# Patient Record
Sex: MALE | Race: WHITE
[De-identification: names, ages, dates, MRNs, and addresses within clinical notes are randomized per-mention and may not be internally consistent; named-entity substitution may affect disease eponyms.]

---

## 2018-03-07 ENCOUNTER — HOSPITAL ENCOUNTER (EMERGENCY)
Dept: HOSPITAL 8 - ED | Age: 38
Discharge: HOME | End: 2018-03-07
Payer: COMMERCIAL

## 2018-03-07 VITALS — SYSTOLIC BLOOD PRESSURE: 145 MMHG | DIASTOLIC BLOOD PRESSURE: 99 MMHG

## 2018-03-07 VITALS — HEIGHT: 74 IN | BODY MASS INDEX: 30.27 KG/M2 | WEIGHT: 235.89 LBS

## 2018-03-07 DIAGNOSIS — Z79.899: ICD-10-CM

## 2018-03-07 DIAGNOSIS — R11.2: Primary | ICD-10-CM

## 2018-03-07 LAB
ALBUMIN SERPL-MCNC: 4.1 G/DL (ref 3.4–5)
ALP SERPL-CCNC: 61 U/L (ref 45–117)
ALT SERPL-CCNC: 66 U/L (ref 12–78)
ANION GAP SERPL CALC-SCNC: 10 MMOL/L (ref 5–15)
BASOPHILS # BLD AUTO: 0.03 X10^3/UL (ref 0–0.1)
BASOPHILS NFR BLD AUTO: 0 % (ref 0–1)
BILIRUB SERPL-MCNC: 1 MG/DL (ref 0.2–1)
CALCIUM SERPL-MCNC: 9.1 MG/DL (ref 8.5–10.1)
CHLORIDE SERPL-SCNC: 110 MMOL/L (ref 98–107)
CREAT SERPL-MCNC: 1.19 MG/DL (ref 0.7–1.3)
CULTURE INDICATED?: NO
EOSINOPHIL # BLD AUTO: 0.09 X10^3/UL (ref 0–0.4)
EOSINOPHIL NFR BLD AUTO: 1 % (ref 1–7)
ERYTHROCYTE [DISTWIDTH] IN BLOOD BY AUTOMATED COUNT: 13.3 % (ref 9.4–14.8)
LYMPHOCYTES # BLD AUTO: 3.42 X10^3/UL (ref 1–3.4)
LYMPHOCYTES NFR BLD AUTO: 34 % (ref 22–44)
MCH RBC QN AUTO: 29.7 PG (ref 27.5–34.5)
MCHC RBC AUTO-ENTMCNC: 33.9 G/DL (ref 33.2–36.2)
MCV RBC AUTO: 87.6 FL (ref 81–97)
MD: NO
MICROSCOPIC: (no result)
MONOCYTES # BLD AUTO: 0.55 X10^3/UL (ref 0.2–0.8)
MONOCYTES NFR BLD AUTO: 5 % (ref 2–9)
NEUTROPHILS # BLD AUTO: 6.03 X10^3/UL (ref 1.8–6.8)
NEUTROPHILS NFR BLD AUTO: 60 % (ref 42–75)
PLATELET # BLD AUTO: 307 X10^3/UL (ref 130–400)
PMV BLD AUTO: 8.2 FL (ref 7.4–10.4)
PROT SERPL-MCNC: 7.7 G/DL (ref 6.4–8.2)
RBC # BLD AUTO: 5.69 X10^6/UL (ref 4.38–5.82)

## 2018-03-07 PROCEDURE — 74022 RADEX COMPL AQT ABD SERIES: CPT

## 2018-03-07 PROCEDURE — 96374 THER/PROPH/DIAG INJ IV PUSH: CPT

## 2018-03-07 PROCEDURE — 96375 TX/PRO/DX INJ NEW DRUG ADDON: CPT

## 2018-03-07 PROCEDURE — 85025 COMPLETE CBC W/AUTO DIFF WBC: CPT

## 2018-03-07 PROCEDURE — 81003 URINALYSIS AUTO W/O SCOPE: CPT

## 2018-03-07 PROCEDURE — 80307 DRUG TEST PRSMV CHEM ANLYZR: CPT

## 2018-03-07 PROCEDURE — 83690 ASSAY OF LIPASE: CPT

## 2018-03-07 PROCEDURE — 96361 HYDRATE IV INFUSION ADD-ON: CPT

## 2018-03-07 PROCEDURE — 80053 COMPREHEN METABOLIC PANEL: CPT

## 2018-03-07 PROCEDURE — 99285 EMERGENCY DEPT VISIT HI MDM: CPT

## 2018-03-07 PROCEDURE — 36415 COLL VENOUS BLD VENIPUNCTURE: CPT

## 2020-03-14 ENCOUNTER — HOSPITAL ENCOUNTER (INPATIENT)
Facility: MEDICAL CENTER | Age: 40
LOS: 4 days | DRG: 339 | End: 2020-03-19
Attending: EMERGENCY MEDICINE | Admitting: SURGERY
Payer: COMMERCIAL

## 2020-03-14 ENCOUNTER — APPOINTMENT (OUTPATIENT)
Dept: RADIOLOGY | Facility: MEDICAL CENTER | Age: 40
DRG: 339 | End: 2020-03-14
Attending: EMERGENCY MEDICINE
Payer: COMMERCIAL

## 2020-03-14 DIAGNOSIS — K35.30 ACUTE APPENDICITIS WITH LOCALIZED PERITONITIS, WITHOUT PERFORATION, ABSCESS, OR GANGRENE: ICD-10-CM

## 2020-03-14 LAB
ALBUMIN SERPL BCP-MCNC: 4.2 G/DL (ref 3.2–4.9)
ALBUMIN/GLOB SERPL: 1.4 G/DL
ALP SERPL-CCNC: 45 U/L (ref 30–99)
ALT SERPL-CCNC: 21 U/L (ref 2–50)
ANION GAP SERPL CALC-SCNC: 11 MMOL/L (ref 7–16)
AST SERPL-CCNC: 17 U/L (ref 12–45)
BASOPHILS # BLD AUTO: 0.5 % (ref 0–1.8)
BASOPHILS # BLD: 0.09 K/UL (ref 0–0.12)
BILIRUB SERPL-MCNC: 1 MG/DL (ref 0.1–1.5)
BUN SERPL-MCNC: 12 MG/DL (ref 8–22)
CALCIUM SERPL-MCNC: 9.5 MG/DL (ref 8.5–10.5)
CHLORIDE SERPL-SCNC: 105 MMOL/L (ref 96–112)
CO2 SERPL-SCNC: 22 MMOL/L (ref 20–33)
CREAT SERPL-MCNC: 0.99 MG/DL (ref 0.5–1.4)
EOSINOPHIL # BLD AUTO: 0.05 K/UL (ref 0–0.51)
EOSINOPHIL NFR BLD: 0.3 % (ref 0–6.9)
ERYTHROCYTE [DISTWIDTH] IN BLOOD BY AUTOMATED COUNT: 41.1 FL (ref 35.9–50)
GLOBULIN SER CALC-MCNC: 3.1 G/DL (ref 1.9–3.5)
GLUCOSE SERPL-MCNC: 132 MG/DL (ref 65–99)
HCT VFR BLD AUTO: 50.6 % (ref 42–52)
HGB BLD-MCNC: 17.1 G/DL (ref 14–18)
IMM GRANULOCYTES # BLD AUTO: 0.07 K/UL (ref 0–0.11)
IMM GRANULOCYTES NFR BLD AUTO: 0.4 % (ref 0–0.9)
LIPASE SERPL-CCNC: 16 U/L (ref 11–82)
LYMPHOCYTES # BLD AUTO: 1.27 K/UL (ref 1–4.8)
LYMPHOCYTES NFR BLD: 7.1 % (ref 22–41)
MCH RBC QN AUTO: 29.8 PG (ref 27–33)
MCHC RBC AUTO-ENTMCNC: 33.8 G/DL (ref 33.7–35.3)
MCV RBC AUTO: 88.3 FL (ref 81.4–97.8)
MONOCYTES # BLD AUTO: 1.17 K/UL (ref 0–0.85)
MONOCYTES NFR BLD AUTO: 6.5 % (ref 0–13.4)
NEUTROPHILS # BLD AUTO: 15.35 K/UL (ref 1.82–7.42)
NEUTROPHILS NFR BLD: 85.2 % (ref 44–72)
NRBC # BLD AUTO: 0 K/UL
NRBC BLD-RTO: 0 /100 WBC
PLATELET # BLD AUTO: 327 K/UL (ref 164–446)
PMV BLD AUTO: 9.2 FL (ref 9–12.9)
POTASSIUM SERPL-SCNC: 3.9 MMOL/L (ref 3.6–5.5)
PROT SERPL-MCNC: 7.3 G/DL (ref 6–8.2)
RBC # BLD AUTO: 5.73 M/UL (ref 4.7–6.1)
SODIUM SERPL-SCNC: 138 MMOL/L (ref 135–145)
WBC # BLD AUTO: 18 K/UL (ref 4.8–10.8)

## 2020-03-14 PROCEDURE — 83690 ASSAY OF LIPASE: CPT

## 2020-03-14 PROCEDURE — 96374 THER/PROPH/DIAG INJ IV PUSH: CPT

## 2020-03-14 PROCEDURE — 700117 HCHG RX CONTRAST REV CODE 255: Performed by: EMERGENCY MEDICINE

## 2020-03-14 PROCEDURE — 74177 CT ABD & PELVIS W/CONTRAST: CPT

## 2020-03-14 PROCEDURE — 700105 HCHG RX REV CODE 258: Performed by: EMERGENCY MEDICINE

## 2020-03-14 PROCEDURE — 80053 COMPREHEN METABOLIC PANEL: CPT

## 2020-03-14 PROCEDURE — 99291 CRITICAL CARE FIRST HOUR: CPT

## 2020-03-14 PROCEDURE — 85025 COMPLETE CBC W/AUTO DIFF WBC: CPT

## 2020-03-14 PROCEDURE — 700111 HCHG RX REV CODE 636 W/ 250 OVERRIDE (IP): Performed by: EMERGENCY MEDICINE

## 2020-03-14 RX ORDER — ESCITALOPRAM OXALATE 10 MG/1
10 TABLET ORAL DAILY
COMMUNITY

## 2020-03-14 RX ORDER — MORPHINE SULFATE 4 MG/ML
4 INJECTION, SOLUTION INTRAMUSCULAR; INTRAVENOUS
Status: DISPENSED | OUTPATIENT
Start: 2020-03-14 | End: 2020-03-15

## 2020-03-14 RX ORDER — SODIUM CHLORIDE 9 MG/ML
1000 INJECTION, SOLUTION INTRAVENOUS ONCE
Status: COMPLETED | OUTPATIENT
Start: 2020-03-14 | End: 2020-03-15

## 2020-03-14 RX ADMIN — MORPHINE SULFATE 4 MG: 4 INJECTION INTRAVENOUS at 22:39

## 2020-03-14 RX ADMIN — IOHEXOL 100 ML: 350 INJECTION, SOLUTION INTRAVENOUS at 23:07

## 2020-03-14 RX ADMIN — SODIUM CHLORIDE 1000 ML: 9 INJECTION, SOLUTION INTRAVENOUS at 22:38

## 2020-03-14 SDOH — HEALTH STABILITY: MENTAL HEALTH: HOW OFTEN DO YOU HAVE A DRINK CONTAINING ALCOHOL?: 2-3 TIMES A WEEK

## 2020-03-14 ASSESSMENT — PAIN DESCRIPTION - DESCRIPTORS: DESCRIPTORS: SHARP;THROBBING

## 2020-03-14 ASSESSMENT — PAIN SCALES - WONG BAKER: WONGBAKER_NUMERICALRESPONSE: DOESN'T HURT AT ALL

## 2020-03-15 ENCOUNTER — ANESTHESIA (OUTPATIENT)
Dept: SURGERY | Facility: MEDICAL CENTER | Age: 40
DRG: 339 | End: 2020-03-15
Payer: COMMERCIAL

## 2020-03-15 ENCOUNTER — ANESTHESIA EVENT (OUTPATIENT)
Dept: SURGERY | Facility: MEDICAL CENTER | Age: 40
DRG: 339 | End: 2020-03-15
Payer: COMMERCIAL

## 2020-03-15 PROBLEM — K35.30 ACUTE APPENDICITIS WITH LOCALIZED PERITONITIS, WITHOUT PERFORATION OR ABSCESS: Status: ACTIVE | Noted: 2020-03-15

## 2020-03-15 PROCEDURE — 501399 HCHG SPECIMAN BAG, ENDO CATC: Performed by: SURGERY

## 2020-03-15 PROCEDURE — 500868 HCHG NEEDLE, SURGI(VARES): Performed by: SURGERY

## 2020-03-15 PROCEDURE — 501572 HCHG TROCAR, SHIELD OBTU 5X100: Performed by: SURGERY

## 2020-03-15 PROCEDURE — 770006 HCHG ROOM/CARE - MED/SURG/GYN SEMI*

## 2020-03-15 PROCEDURE — 88304 TISSUE EXAM BY PATHOLOGIST: CPT

## 2020-03-15 PROCEDURE — 502571 HCHG PACK, LAP CHOLE: Performed by: SURGERY

## 2020-03-15 PROCEDURE — A6402 STERILE GAUZE <= 16 SQ IN: HCPCS | Performed by: SURGERY

## 2020-03-15 PROCEDURE — 501838 HCHG SUTURE GENERAL: Performed by: SURGERY

## 2020-03-15 PROCEDURE — 700101 HCHG RX REV CODE 250: Performed by: SURGERY

## 2020-03-15 PROCEDURE — 0DTJ4ZZ RESECTION OF APPENDIX, PERCUTANEOUS ENDOSCOPIC APPROACH: ICD-10-PCS | Performed by: SURGERY

## 2020-03-15 PROCEDURE — 160002 HCHG RECOVERY MINUTES (STAT): Performed by: SURGERY

## 2020-03-15 PROCEDURE — 700105 HCHG RX REV CODE 258: Performed by: ANESTHESIOLOGY

## 2020-03-15 PROCEDURE — 700101 HCHG RX REV CODE 250: Performed by: ANESTHESIOLOGY

## 2020-03-15 PROCEDURE — 501338 HCHG SHEARS, ENDO: Performed by: SURGERY

## 2020-03-15 PROCEDURE — 3E02340 INTRODUCTION OF INFLUENZA VACCINE INTO MUSCLE, PERCUTANEOUS APPROACH: ICD-10-PCS | Performed by: SURGERY

## 2020-03-15 PROCEDURE — 160035 HCHG PACU - 1ST 60 MINS PHASE I: Performed by: SURGERY

## 2020-03-15 PROCEDURE — 160039 HCHG SURGERY MINUTES - EA ADDL 1 MIN LEVEL 3: Performed by: SURGERY

## 2020-03-15 PROCEDURE — 90686 IIV4 VACC NO PRSV 0.5 ML IM: CPT | Performed by: SURGERY

## 2020-03-15 PROCEDURE — 700105 HCHG RX REV CODE 258: Performed by: SURGERY

## 2020-03-15 PROCEDURE — 160028 HCHG SURGERY MINUTES - 1ST 30 MINS LEVEL 3: Performed by: SURGERY

## 2020-03-15 PROCEDURE — 160009 HCHG ANES TIME/MIN: Performed by: SURGERY

## 2020-03-15 PROCEDURE — A9270 NON-COVERED ITEM OR SERVICE: HCPCS | Performed by: SURGERY

## 2020-03-15 PROCEDURE — 160048 HCHG OR STATISTICAL LEVEL 1-5: Performed by: SURGERY

## 2020-03-15 PROCEDURE — 700111 HCHG RX REV CODE 636 W/ 250 OVERRIDE (IP): Performed by: SURGERY

## 2020-03-15 PROCEDURE — A9270 NON-COVERED ITEM OR SERVICE: HCPCS | Performed by: ANESTHESIOLOGY

## 2020-03-15 PROCEDURE — 501582 HCHG TROCAR, THRD BLADED: Performed by: SURGERY

## 2020-03-15 PROCEDURE — 700102 HCHG RX REV CODE 250 W/ 637 OVERRIDE(OP): Performed by: ANESTHESIOLOGY

## 2020-03-15 PROCEDURE — 700111 HCHG RX REV CODE 636 W/ 250 OVERRIDE (IP): Performed by: ANESTHESIOLOGY

## 2020-03-15 PROCEDURE — 700102 HCHG RX REV CODE 250 W/ 637 OVERRIDE(OP): Performed by: SURGERY

## 2020-03-15 RX ORDER — IPRATROPIUM BROMIDE AND ALBUTEROL SULFATE 2.5; .5 MG/3ML; MG/3ML
3 SOLUTION RESPIRATORY (INHALATION)
Status: DISCONTINUED | OUTPATIENT
Start: 2020-03-15 | End: 2020-03-15 | Stop reason: HOSPADM

## 2020-03-15 RX ORDER — OXYCODONE AND ACETAMINOPHEN 10; 325 MG/1; MG/1
1 TABLET ORAL EVERY 6 HOURS PRN
Status: DISCONTINUED | OUTPATIENT
Start: 2020-03-15 | End: 2020-03-19 | Stop reason: HOSPADM

## 2020-03-15 RX ORDER — ONDANSETRON 2 MG/ML
4 INJECTION INTRAMUSCULAR; INTRAVENOUS
Status: DISCONTINUED | OUTPATIENT
Start: 2020-03-15 | End: 2020-03-15 | Stop reason: HOSPADM

## 2020-03-15 RX ORDER — HYDROMORPHONE HYDROCHLORIDE 1 MG/ML
1 INJECTION, SOLUTION INTRAMUSCULAR; INTRAVENOUS; SUBCUTANEOUS
Status: DISCONTINUED | OUTPATIENT
Start: 2020-03-15 | End: 2020-03-15 | Stop reason: HOSPADM

## 2020-03-15 RX ORDER — KETOROLAC TROMETHAMINE 30 MG/ML
INJECTION, SOLUTION INTRAMUSCULAR; INTRAVENOUS PRN
Status: DISCONTINUED | OUTPATIENT
Start: 2020-03-15 | End: 2020-03-15 | Stop reason: SURG

## 2020-03-15 RX ORDER — MIDAZOLAM HYDROCHLORIDE 1 MG/ML
INJECTION INTRAMUSCULAR; INTRAVENOUS PRN
Status: DISCONTINUED | OUTPATIENT
Start: 2020-03-15 | End: 2020-03-15 | Stop reason: SURG

## 2020-03-15 RX ORDER — DEXAMETHASONE SODIUM PHOSPHATE 4 MG/ML
INJECTION, SOLUTION INTRA-ARTICULAR; INTRALESIONAL; INTRAMUSCULAR; INTRAVENOUS; SOFT TISSUE PRN
Status: DISCONTINUED | OUTPATIENT
Start: 2020-03-15 | End: 2020-03-15 | Stop reason: SURG

## 2020-03-15 RX ORDER — OXYCODONE HCL 5 MG/5 ML
10 SOLUTION, ORAL ORAL
Status: COMPLETED | OUTPATIENT
Start: 2020-03-15 | End: 2020-03-15

## 2020-03-15 RX ORDER — SODIUM CHLORIDE 9 MG/ML
INJECTION, SOLUTION INTRAVENOUS
Status: DISCONTINUED | OUTPATIENT
Start: 2020-03-15 | End: 2020-03-15 | Stop reason: SURG

## 2020-03-15 RX ORDER — LIDOCAINE HYDROCHLORIDE 40 MG/ML
SOLUTION TOPICAL PRN
Status: DISCONTINUED | OUTPATIENT
Start: 2020-03-15 | End: 2020-03-15 | Stop reason: SURG

## 2020-03-15 RX ORDER — SCOLOPAMINE TRANSDERMAL SYSTEM 1 MG/1
1 PATCH, EXTENDED RELEASE TRANSDERMAL
Status: DISCONTINUED | OUTPATIENT
Start: 2020-03-15 | End: 2020-03-19 | Stop reason: HOSPADM

## 2020-03-15 RX ORDER — CEFOTETAN DISODIUM 2 G/20ML
INJECTION, POWDER, FOR SOLUTION INTRAMUSCULAR; INTRAVENOUS PRN
Status: DISCONTINUED | OUTPATIENT
Start: 2020-03-15 | End: 2020-03-15 | Stop reason: SURG

## 2020-03-15 RX ORDER — MORPHINE SULFATE 4 MG/ML
1-5 INJECTION, SOLUTION INTRAMUSCULAR; INTRAVENOUS
Status: DISCONTINUED | OUTPATIENT
Start: 2020-03-15 | End: 2020-03-19 | Stop reason: HOSPADM

## 2020-03-15 RX ORDER — ONDANSETRON 2 MG/ML
INJECTION INTRAMUSCULAR; INTRAVENOUS PRN
Status: DISCONTINUED | OUTPATIENT
Start: 2020-03-15 | End: 2020-03-15 | Stop reason: SURG

## 2020-03-15 RX ORDER — METOPROLOL TARTRATE 1 MG/ML
INJECTION, SOLUTION INTRAVENOUS PRN
Status: DISCONTINUED | OUTPATIENT
Start: 2020-03-15 | End: 2020-03-15 | Stop reason: SURG

## 2020-03-15 RX ORDER — DIPHENHYDRAMINE HYDROCHLORIDE 50 MG/ML
12.5 INJECTION INTRAMUSCULAR; INTRAVENOUS
Status: DISCONTINUED | OUTPATIENT
Start: 2020-03-15 | End: 2020-03-15 | Stop reason: HOSPADM

## 2020-03-15 RX ORDER — MIDAZOLAM HYDROCHLORIDE 1 MG/ML
1 INJECTION INTRAMUSCULAR; INTRAVENOUS
Status: DISCONTINUED | OUTPATIENT
Start: 2020-03-15 | End: 2020-03-15 | Stop reason: HOSPADM

## 2020-03-15 RX ORDER — DIPHENHYDRAMINE HYDROCHLORIDE 50 MG/ML
25 INJECTION INTRAMUSCULAR; INTRAVENOUS EVERY 6 HOURS PRN
Status: DISCONTINUED | OUTPATIENT
Start: 2020-03-15 | End: 2020-03-19 | Stop reason: HOSPADM

## 2020-03-15 RX ORDER — OXYCODONE HCL 5 MG/5 ML
5 SOLUTION, ORAL ORAL
Status: COMPLETED | OUTPATIENT
Start: 2020-03-15 | End: 2020-03-15

## 2020-03-15 RX ORDER — DEXTROSE MONOHYDRATE, SODIUM CHLORIDE, AND POTASSIUM CHLORIDE 50; 1.49; 4.5 G/1000ML; G/1000ML; G/1000ML
INJECTION, SOLUTION INTRAVENOUS CONTINUOUS
Status: DISCONTINUED | OUTPATIENT
Start: 2020-03-15 | End: 2020-03-15

## 2020-03-15 RX ORDER — MEPERIDINE HYDROCHLORIDE 25 MG/ML
12.5 INJECTION INTRAMUSCULAR; INTRAVENOUS; SUBCUTANEOUS
Status: DISCONTINUED | OUTPATIENT
Start: 2020-03-15 | End: 2020-03-15 | Stop reason: HOSPADM

## 2020-03-15 RX ORDER — SODIUM CHLORIDE, SODIUM GLUCONATE, SODIUM ACETATE, POTASSIUM CHLORIDE AND MAGNESIUM CHLORIDE 526; 502; 368; 37; 30 MG/100ML; MG/100ML; MG/100ML; MG/100ML; MG/100ML
500 INJECTION, SOLUTION INTRAVENOUS CONTINUOUS
Status: DISCONTINUED | OUTPATIENT
Start: 2020-03-15 | End: 2020-03-15 | Stop reason: HOSPADM

## 2020-03-15 RX ORDER — SODIUM CHLORIDE, SODIUM GLUCONATE, SODIUM ACETATE, POTASSIUM CHLORIDE AND MAGNESIUM CHLORIDE 526; 502; 368; 37; 30 MG/100ML; MG/100ML; MG/100ML; MG/100ML; MG/100ML
INJECTION, SOLUTION INTRAVENOUS
Status: DISCONTINUED | OUTPATIENT
Start: 2020-03-15 | End: 2020-03-15 | Stop reason: SURG

## 2020-03-15 RX ORDER — HALOPERIDOL 5 MG/ML
1 INJECTION INTRAMUSCULAR
Status: DISCONTINUED | OUTPATIENT
Start: 2020-03-15 | End: 2020-03-15 | Stop reason: HOSPADM

## 2020-03-15 RX ORDER — BUPIVACAINE HYDROCHLORIDE AND EPINEPHRINE 5; 5 MG/ML; UG/ML
INJECTION, SOLUTION EPIDURAL; INTRACAUDAL; PERINEURAL
Status: DISCONTINUED | OUTPATIENT
Start: 2020-03-15 | End: 2020-03-15 | Stop reason: HOSPADM

## 2020-03-15 RX ORDER — HYDROMORPHONE HYDROCHLORIDE 1 MG/ML
0.4 INJECTION, SOLUTION INTRAMUSCULAR; INTRAVENOUS; SUBCUTANEOUS
Status: DISCONTINUED | OUTPATIENT
Start: 2020-03-15 | End: 2020-03-15 | Stop reason: HOSPADM

## 2020-03-15 RX ORDER — ONDANSETRON 2 MG/ML
4 INJECTION INTRAMUSCULAR; INTRAVENOUS EVERY 4 HOURS PRN
Status: DISCONTINUED | OUTPATIENT
Start: 2020-03-15 | End: 2020-03-19 | Stop reason: HOSPADM

## 2020-03-15 RX ORDER — HYDROMORPHONE HYDROCHLORIDE 1 MG/ML
0.2 INJECTION, SOLUTION INTRAMUSCULAR; INTRAVENOUS; SUBCUTANEOUS
Status: DISCONTINUED | OUTPATIENT
Start: 2020-03-15 | End: 2020-03-15 | Stop reason: HOSPADM

## 2020-03-15 RX ADMIN — POTASSIUM CHLORIDE, DEXTROSE MONOHYDRATE AND SODIUM CHLORIDE: 150; 5; 450 INJECTION, SOLUTION INTRAVENOUS at 12:57

## 2020-03-15 RX ADMIN — ONDANSETRON 4 MG: 2 INJECTION INTRAMUSCULAR; INTRAVENOUS at 02:14

## 2020-03-15 RX ADMIN — MORPHINE SULFATE 4 MG: 4 INJECTION INTRAVENOUS at 06:29

## 2020-03-15 RX ADMIN — METRONIDAZOLE 500 MG: 500 INJECTION, SOLUTION INTRAVENOUS at 01:37

## 2020-03-15 RX ADMIN — FENTANYL CITRATE 100 MCG: 50 INJECTION, SOLUTION INTRAMUSCULAR; INTRAVENOUS at 02:16

## 2020-03-15 RX ADMIN — OXYCODONE HYDROCHLORIDE 5 MG: 5 SOLUTION ORAL at 03:02

## 2020-03-15 RX ADMIN — OXYCODONE AND ACETAMINOPHEN 1 TABLET: 10; 325 TABLET ORAL at 20:07

## 2020-03-15 RX ADMIN — OXYCODONE AND ACETAMINOPHEN 1 TABLET: 10; 325 TABLET ORAL at 09:42

## 2020-03-15 RX ADMIN — MIDAZOLAM HYDROCHLORIDE 2 MG: 1 INJECTION, SOLUTION INTRAMUSCULAR; INTRAVENOUS at 01:24

## 2020-03-15 RX ADMIN — FENTANYL CITRATE 100 MCG: 50 INJECTION, SOLUTION INTRAMUSCULAR; INTRAVENOUS at 01:24

## 2020-03-15 RX ADMIN — INFLUENZA A VIRUS A/BRISBANE/02/2018 IVR-190 (H1N1) ANTIGEN (FORMALDEHYDE INACTIVATED), INFLUENZA A VIRUS A/KANSAS/14/2017 X-327 (H3N2) ANTIGEN (FORMALDEHYDE INACTIVATED), INFLUENZA B VIRUS B/PHUKET/3073/2013 ANTIGEN (FORMALDEHYDE INACTIVATED), AND INFLUENZA B VIRUS B/MARYLAND/15/2016 BX-69A ANTIGEN (FORMALDEHYDE INACTIVATED) 0.5 ML: 15; 15; 15; 15 INJECTION, SUSPENSION INTRAMUSCULAR at 14:53

## 2020-03-15 RX ADMIN — MORPHINE SULFATE 4 MG: 4 INJECTION INTRAVENOUS at 14:54

## 2020-03-15 RX ADMIN — CEFTRIAXONE SODIUM 2 G: 2 INJECTION, POWDER, FOR SOLUTION INTRAMUSCULAR; INTRAVENOUS at 12:57

## 2020-03-15 RX ADMIN — POTASSIUM CHLORIDE, DEXTROSE MONOHYDRATE AND SODIUM CHLORIDE: 150; 5; 450 INJECTION, SOLUTION INTRAVENOUS at 05:44

## 2020-03-15 RX ADMIN — KETOROLAC TROMETHAMINE 30 MG: 30 INJECTION, SOLUTION INTRAMUSCULAR at 02:14

## 2020-03-15 RX ADMIN — SUGAMMADEX 200 MG: 100 INJECTION, SOLUTION INTRAVENOUS at 02:14

## 2020-03-15 RX ADMIN — METRONIDAZOLE 500 MG: 500 INJECTION, SOLUTION INTRAVENOUS at 08:40

## 2020-03-15 RX ADMIN — METOPROLOL TARTRATE 5 MG: 5 INJECTION, SOLUTION INTRAVENOUS at 01:58

## 2020-03-15 RX ADMIN — LIDOCAINE HYDROCHLORIDE 4 ML: 40 SOLUTION TOPICAL at 01:31

## 2020-03-15 RX ADMIN — SODIUM CHLORIDE, SODIUM GLUCONATE, SODIUM ACETATE, POTASSIUM CHLORIDE AND MAGNESIUM CHLORIDE 500 ML: 526; 502; 368; 37; 30 INJECTION, SOLUTION INTRAVENOUS at 03:23

## 2020-03-15 RX ADMIN — ROCURONIUM BROMIDE 80 MG: 10 INJECTION, SOLUTION INTRAVENOUS at 01:31

## 2020-03-15 RX ADMIN — PROPOFOL 150 MG: 10 INJECTION, EMULSION INTRAVENOUS at 01:31

## 2020-03-15 RX ADMIN — SODIUM CHLORIDE, SODIUM GLUCONATE, SODIUM ACETATE, POTASSIUM CHLORIDE AND MAGNESIUM CHLORIDE: 526; 502; 368; 37; 30 INJECTION, SOLUTION INTRAVENOUS at 01:49

## 2020-03-15 RX ADMIN — SODIUM CHLORIDE: 9 INJECTION, SOLUTION INTRAVENOUS at 01:22

## 2020-03-15 RX ADMIN — DEXAMETHASONE SODIUM PHOSPHATE 8 MG: 4 INJECTION, SOLUTION INTRA-ARTICULAR; INTRALESIONAL; INTRAMUSCULAR; INTRAVENOUS; SOFT TISSUE at 01:37

## 2020-03-15 RX ADMIN — LIDOCAINE HYDROCHLORIDE 50 MG: 20 INJECTION, SOLUTION INTRAVENOUS at 01:31

## 2020-03-15 RX ADMIN — CEFOTETAN DISODIUM 2 G: 2 INJECTION, POWDER, FOR SOLUTION INTRAMUSCULAR; INTRAVENOUS at 01:37

## 2020-03-15 RX ADMIN — METRONIDAZOLE 500 MG: 500 INJECTION, SOLUTION INTRAVENOUS at 15:19

## 2020-03-15 ASSESSMENT — PATIENT HEALTH QUESTIONNAIRE - PHQ9
2. FEELING DOWN, DEPRESSED, IRRITABLE, OR HOPELESS: NOT AT ALL
2. FEELING DOWN, DEPRESSED, IRRITABLE, OR HOPELESS: NOT AT ALL
1. LITTLE INTEREST OR PLEASURE IN DOING THINGS: NOT AT ALL
SUM OF ALL RESPONSES TO PHQ9 QUESTIONS 1 AND 2: 0
1. LITTLE INTEREST OR PLEASURE IN DOING THINGS: NOT AT ALL
SUM OF ALL RESPONSES TO PHQ9 QUESTIONS 1 AND 2: 0

## 2020-03-15 ASSESSMENT — COGNITIVE AND FUNCTIONAL STATUS - GENERAL
DAILY ACTIVITIY SCORE: 22
MOVING FROM LYING ON BACK TO SITTING ON SIDE OF FLAT BED: A LITTLE
MOVING TO AND FROM BED TO CHAIR: A LITTLE
CLIMB 3 TO 5 STEPS WITH RAILING: A LITTLE
SUGGESTED CMS G CODE MODIFIER DAILY ACTIVITY: CJ
TURNING FROM BACK TO SIDE WHILE IN FLAT BAD: A LITTLE
HELP NEEDED FOR BATHING: A LITTLE
SUGGESTED CMS G CODE MODIFIER MOBILITY: CK
STANDING UP FROM CHAIR USING ARMS: A LITTLE
WALKING IN HOSPITAL ROOM: A LITTLE
DRESSING REGULAR LOWER BODY CLOTHING: A LITTLE
MOBILITY SCORE: 18

## 2020-03-15 ASSESSMENT — FIBROSIS 4 INDEX: FIB4 SCORE: 0.47

## 2020-03-15 ASSESSMENT — ENCOUNTER SYMPTOMS
NAUSEA: 0
CARDIOVASCULAR NEGATIVE: 1
RESPIRATORY NEGATIVE: 1
VOMITING: 0

## 2020-03-15 ASSESSMENT — PAIN SCALES - GENERAL: PAIN_LEVEL: 0

## 2020-03-15 NOTE — PROGRESS NOTES
Surgical Progress Note    Author: Leonardo Orellana M.D. Date & Time created: 3/15/2020   9:34 AM     Interval Events:    Review of Systems   Respiratory: Negative.    Cardiovascular: Negative.    Gastrointestinal: Negative for nausea and vomiting.        Pain much improved     Hemodynamics:  Temp (24hrs), Av.9 °C (98.4 °F), Min:36.1 °C (97 °F), Max:37.4 °C (99.4 °F)  Temperature: 37.1 °C (98.8 °F)  Pulse  Av.5  Min: 63  Max: 101   Blood Pressure: 131/89     Respiratory:    Respiration: 16, Pulse Oximetry: 93 %           Neuro:  GCS       Fluids:    Intake/Output Summary (Last 24 hours) at 3/15/2020 0934  Last data filed at 3/15/2020 0430  Gross per 24 hour   Intake 1200 ml   Output 800 ml   Net 400 ml     Weight: 98.1 kg (216 lb 4.3 oz)  Current Diet Order   Procedures   • Diet Order Clear Liquid     Physical Exam  Cardiovascular:      Rate and Rhythm: Normal rate.   Pulmonary:      Effort: Pulmonary effort is normal.   Abdominal:      General: There is no distension.      Palpations: Abdomen is soft.      Comments: Incisional tenderness, serosanguineous JEREMIAS output        Labs:  Recent Results (from the past 24 hour(s))   CBC WITH DIFFERENTIAL    Collection Time: 20  9:58 PM   Result Value Ref Range    WBC 18.0 (H) 4.8 - 10.8 K/uL    RBC 5.73 4.70 - 6.10 M/uL    Hemoglobin 17.1 14.0 - 18.0 g/dL    Hematocrit 50.6 42.0 - 52.0 %    MCV 88.3 81.4 - 97.8 fL    MCH 29.8 27.0 - 33.0 pg    MCHC 33.8 33.7 - 35.3 g/dL    RDW 41.1 35.9 - 50.0 fL    Platelet Count 327 164 - 446 K/uL    MPV 9.2 9.0 - 12.9 fL    Neutrophils-Polys 85.20 (H) 44.00 - 72.00 %    Lymphocytes 7.10 (L) 22.00 - 41.00 %    Monocytes 6.50 0.00 - 13.40 %    Eosinophils 0.30 0.00 - 6.90 %    Basophils 0.50 0.00 - 1.80 %    Immature Granulocytes 0.40 0.00 - 0.90 %    Nucleated RBC 0.00 /100 WBC    Neutrophils (Absolute) 15.35 (H) 1.82 - 7.42 K/uL    Lymphs (Absolute) 1.27 1.00 - 4.80 K/uL    Monos (Absolute) 1.17 (H) 0.00 - 0.85 K/uL    Eos  (Absolute) 0.05 0.00 - 0.51 K/uL    Baso (Absolute) 0.09 0.00 - 0.12 K/uL    Immature Granulocytes (abs) 0.07 0.00 - 0.11 K/uL    NRBC (Absolute) 0.00 K/uL   COMP METABOLIC PANEL    Collection Time: 03/14/20  9:58 PM   Result Value Ref Range    Sodium 138 135 - 145 mmol/L    Potassium 3.9 3.6 - 5.5 mmol/L    Chloride 105 96 - 112 mmol/L    Co2 22 20 - 33 mmol/L    Anion Gap 11.0 7.0 - 16.0    Glucose 132 (H) 65 - 99 mg/dL    Bun 12 8 - 22 mg/dL    Creatinine 0.99 0.50 - 1.40 mg/dL    Calcium 9.5 8.5 - 10.5 mg/dL    AST(SGOT) 17 12 - 45 U/L    ALT(SGPT) 21 2 - 50 U/L    Alkaline Phosphatase 45 30 - 99 U/L    Total Bilirubin 1.0 0.1 - 1.5 mg/dL    Albumin 4.2 3.2 - 4.9 g/dL    Total Protein 7.3 6.0 - 8.2 g/dL    Globulin 3.1 1.9 - 3.5 g/dL    A-G Ratio 1.4 g/dL   LIPASE    Collection Time: 03/14/20  9:58 PM   Result Value Ref Range    Lipase 16 11 - 82 U/L   ESTIMATED GFR    Collection Time: 03/14/20  9:58 PM   Result Value Ref Range    GFR If African American >60 >60 mL/min/1.73 m 2    GFR If Non African American >60 >60 mL/min/1.73 m 2     Medical Decision Making, by Problem:  Active Hospital Problems    Diagnosis   • Acute appendicitis with localized peritonitis, without perforation or abscess [K35.30]     Plan:  Start clears, continue antibiotics, ambulate. Doing well CBC in AM    Quality Measures:  Quality-Core Measures    Discussed patient condition with RN and Patient

## 2020-03-15 NOTE — H&P
HISTORY OF PRESENT ILLNESS:  The patient is a 39-year-old gentleman who began   feeling sick yesterday with abdominal pain that localized to the right lower   quadrant, became more severe, causing him to seek medical attention this   evening where he was evaluated in the ER, found to have an 18,000 white count   and on CT scan evidence of acute appendicitis without perforation.  I was   asked to see the patient.  Patient states that he has had nausea and vomiting,   diarrhea since yesterday, but none this evening.  He describes the pain as a   10 and localized to the right lower quadrant.    ALLERGIES:  No known drug allergies.    MEDICATIONS:  Lexapro 10 mg p.o. daily.    PAST MEDICAL HISTORY:  Depression.    PAST SURGICAL HISTORY:  No previous surgical history.    SOCIAL HISTORY:  Does not smoke.  Rare alcohol use, no drug use.    REVIEW OF SYSTEMS:  Patient denies any neurological or cardiopulmonary   problems.  24-hour history of nausea, vomiting, abdominal pain, and diarrhea.    No urinary tract symptoms.  No musculoskeletal complaints.  All other systems   are negative and noncontributory.    FAMILY HISTORY:  No significant family history.    PHYSICAL EXAMINATION:  VITAL SIGNS:  Temperature of 37.4, heart rate of 88, respirations 16, blood   pressure 137/79.  HEAD AND NECK:  Pupils equal, round, reactive to light.  Extraocular movements   are intact.  No scleral icterus.  NECK:  No cervical adenopathy.  LUNGS:  Clear bilaterally without wheezes, rales or rhonchi.  Normal chest   wall expansion.  HEART:  Regular rate and rhythm without murmurs, rubs or gallops.  No carotid   bruits.  No jugular venous distention.  No peripheral edema.  ABDOMEN:  Soft, but with hypoactive bowel sounds with pinpoint tenderness on   McBurney's point with rebound, guarding, and peritoneal signs.  MUSCULOSKELETAL:  Moves all 4 extremities in normal range of motion.  Strength   grossly normal.  NEUROLOGICAL:  Cranial nerves II-XII  are grossly intact.  Sensation grossly   normal.  PSYCHIATRIC:  Alert and oriented x3.  Mood and affect are appropriate.  LYMPHATICS:  No cervical, supraclavicular, or inguinal adenopathy.  SKIN:  Normal texture, turgor and coloration.    IMPRESSION:  1.  Acute appendicitis.  2.  History of depression, currently being treated.    PLAN:  Laparoscopic appendectomy.  I have explained the risks and benefits of   surgery to him including bleeding, infection, injury to the bowel, appendiceal   stump leak, and conversion to open technique.  He understands these risks and   agrees to proceed.       ____________________________________     Leonardo Orellana MD    PMS / NTS    DD:  03/15/2020 01:14:46  DT:  03/15/2020 05:16:08    D#:  [id number]  Job#:  [id number]

## 2020-03-15 NOTE — ED PROVIDER NOTES
"ED Provider Note    Scribed for Sharan Ghotra M.D. by Janet Coto. 3/14/2020, 10:00 PM.    Primary care provider: Dr. Crump  Means of arrival: EMS  History obtained from: Patient  History limited by: None    CHIEF COMPLAINT  Chief Complaint   Patient presents with   • Abdominal Pain   • Nausea/Vomiting/Diarrhea     HPI  Alek Davila is a 39 y.o. male who presents to the Emergency Department for evaluation of abdominal pain with associated nausea, vomiting, and diarrhea onset yesterday. The patient reports the pain has been constant and has not moved from the right lower quadrant. He denies any associated testicle pain. The patient reports a history of undiagnosed GI issues. He states that he may have had pancreatitis. He does not have a history of abdominal surgeries. The patient reports that he last ate at 10:30 AM yesterday but has been drinking fluids. The patient denies any known sick contact but reports that he does have regular contact with the public due to the nature of his job.     REVIEW OF SYSTEMS  See HPI for further details. All other systems are negative.     PAST MEDICAL HISTORY   has a past medical history of Anxiety, Depression, and Pancreatitis.    SURGICAL HISTORY  patient denies any surgical history    SOCIAL HISTORY  Social History     Tobacco Use   • Smoking status: Never Smoker   • Smokeless tobacco: Never Used   Substance Use Topics   • Alcohol use: Not Currently     Frequency: 2-3 times a week     Comment: social   • Drug use: Never      Social History     Substance and Sexual Activity   Drug Use Never     FAMILY HISTORY  History reviewed. No pertinent family history.    CURRENT MEDICATIONS  Reviewed.  See Encounter Summary.     ALLERGIES  No Known Allergies    PHYSICAL EXAM  VITAL SIGNS: /78   Pulse 97   Temp 36.1 °C (97 °F) (Temporal)   Resp (!) 22   Ht 1.88 m (6' 2\")   Wt 102.1 kg (225 lb)   SpO2 95%   BMI 28.89 kg/m²   Constitutional: Alert. Mild distress. "   HENT: No signs of trauma, Bilateral external ears normal, Nose normal.   Eyes: Pupils are equal and reactive, Conjunctiva normal, Non-icteric.   Neck: Normal range of motion, No tenderness, Supple, No stridor.   Cardiovascular: Regular rate and rhythm, no murmurs.   Thorax & Lungs: Normal breath sounds, No respiratory distress, No wheezing, No chest tenderness.   Abdomen: Right lower quadrant tenderness, guarding, unable to perform rebound secondary to the patient's discomfort, Soft, No masses, No pulsatile masses. No peritoneal signs.  Skin: Warm, Dry, No erythema, No rash.   Back: No bony tenderness, No CVA tenderness.   Extremities: Intact distal pulses, No edema, No tenderness, No cyanosis  Musculoskeletal: Good range of motion in all major joints. No tenderness to palpation or major deformities noted.   Neurologic: Alert , Normal motor function, Normal sensory function, No focal deficits noted.   Psychiatric: Affect normal, Judgment normal, Mood normal.     DIAGNOSTIC STUDIES / PROCEDURES     LABS  Results for orders placed or performed during the hospital encounter of 03/14/20   CBC WITH DIFFERENTIAL   Result Value Ref Range    WBC 18.0 (H) 4.8 - 10.8 K/uL    RBC 5.73 4.70 - 6.10 M/uL    Hemoglobin 17.1 14.0 - 18.0 g/dL    Hematocrit 50.6 42.0 - 52.0 %    MCV 88.3 81.4 - 97.8 fL    MCH 29.8 27.0 - 33.0 pg    MCHC 33.8 33.7 - 35.3 g/dL    RDW 41.1 35.9 - 50.0 fL    Platelet Count 327 164 - 446 K/uL    MPV 9.2 9.0 - 12.9 fL    Neutrophils-Polys 85.20 (H) 44.00 - 72.00 %    Lymphocytes 7.10 (L) 22.00 - 41.00 %    Monocytes 6.50 0.00 - 13.40 %    Eosinophils 0.30 0.00 - 6.90 %    Basophils 0.50 0.00 - 1.80 %    Immature Granulocytes 0.40 0.00 - 0.90 %    Nucleated RBC 0.00 /100 WBC    Neutrophils (Absolute) 15.35 (H) 1.82 - 7.42 K/uL    Lymphs (Absolute) 1.27 1.00 - 4.80 K/uL    Monos (Absolute) 1.17 (H) 0.00 - 0.85 K/uL    Eos (Absolute) 0.05 0.00 - 0.51 K/uL    Baso (Absolute) 0.09 0.00 - 0.12 K/uL    Immature  Granulocytes (abs) 0.07 0.00 - 0.11 K/uL    NRBC (Absolute) 0.00 K/uL   COMP METABOLIC PANEL   Result Value Ref Range    Sodium 138 135 - 145 mmol/L    Potassium 3.9 3.6 - 5.5 mmol/L    Chloride 105 96 - 112 mmol/L    Co2 22 20 - 33 mmol/L    Anion Gap 11.0 7.0 - 16.0    Glucose 132 (H) 65 - 99 mg/dL    Bun 12 8 - 22 mg/dL    Creatinine 0.99 0.50 - 1.40 mg/dL    Calcium 9.5 8.5 - 10.5 mg/dL    AST(SGOT) 17 12 - 45 U/L    ALT(SGPT) 21 2 - 50 U/L    Alkaline Phosphatase 45 30 - 99 U/L    Total Bilirubin 1.0 0.1 - 1.5 mg/dL    Albumin 4.2 3.2 - 4.9 g/dL    Total Protein 7.3 6.0 - 8.2 g/dL    Globulin 3.1 1.9 - 3.5 g/dL    A-G Ratio 1.4 g/dL   LIPASE   Result Value Ref Range    Lipase 16 11 - 82 U/L   ESTIMATED GFR   Result Value Ref Range    GFR If African American >60 >60 mL/min/1.73 m 2    GFR If Non African American >60 >60 mL/min/1.73 m 2     All labs were reviewed by me.    RADIOLOGY  CT-ABDOMEN-PELVIS WITH   Final Result         Findings in keeping with acute appendicitis. No free air or intra-abdominal abscess. Small amount of free fluid.                 The radiologist's interpretation of all radiological studies and images have been reviewed by me.    COURSE & MEDICAL DECISION MAKING  Pertinent Labs & Imaging studies reviewed. (See chart for details)    10:00 PM - Patient seen and examined at bedside. Patient will be treated with NS infusion 1,000 mL and morphine 4 mg/mL. Ordered CT-Abdomen-Pelvis, estimated GFR, CBC with differential, CMP, lipase, and UA to evaluate his symptoms.     11:44 PM - Paged Surgery.    11:46 PM - I spoke with Dr. Orellana (Surgery) who agrees to evaluate the patient for hospitalization.    11:53 PM - I informed the patient that he has acute appendicitis and that Dr. Orellana will take him to surgery after he finishes his other case. The patient understands the plan of care and verbalizes his agreement.     HYDRATION: Based on the patient's presentation of Tachycardia and Other NPO and  sepsis the patient was given IV fluids. IV Hydration was used because oral hydration was not adequate alone. Upon recheck following hydration, the patient was improved.    Decision Making:  This is a 39 y.o. year old male who presents with above complaint.  Worsening focal abdominal pain as described above.  Significant leukocytosis and CT imaging confirming acute appendicitis without perforation or abscess.  I discussed the case with surgeon on-call which will take the patient to the OR for definitive care.  Patient has been kept n.p.o.    DISPOSITION:  Patient will be hospitalized by Dr. Orellana in guarded condition.    FINAL IMPRESSION  1. Acute appendicitis with localized peritonitis, without perforation, abscess, or gangrene          Janet POSADAS (Scribhoward), am scribing for, and in the presence of, Sharan Ghotra M.D..    Electronically signed by: Janet Coto (Edda), 3/14/2020    Sharan POSADAS M.D. personally performed the services described in this documentation, as scribed by Janet Coto in my presence, and it is both accurate and complete.    C.     The note accurately reflects work and decisions made by me.  Sharan Ghotra M.D.  3/15/2020  5:16 AM

## 2020-03-15 NOTE — ANESTHESIA PREPROCEDURE EVALUATION
Relevant Problems   No relevant active problems       Physical Exam    Airway   Mallampati: II  TM distance: >3 FB  Neck ROM: full       Cardiovascular - normal exam  Rhythm: regular  Rate: normal  (-) murmur     Dental - normal exam         Pulmonary - normal exam  Breath sounds clear to auscultation     Abdominal    Neurological - normal exam                 Anesthesia Plan    ASA 2- EMERGENT       Plan - general       Airway plan will be ETT  (Acute appendicitics)      Induction: intravenous    Postoperative Plan: Postoperative administration of opioids is intended.    Pertinent diagnostic labs and testing reviewed    Informed Consent:    Anesthetic plan and risks discussed with patient.    Use of blood products discussed with: patient whom consented to blood products.

## 2020-03-15 NOTE — OR NURSING
0225 Pt from OR, asleep, with ET airway and O2 support by blow-by of 10 L/min via mask, respirations regular and spontaneous, vital signs within normal limits. Pt's abdomen with 3 lap incision sites, dressings are clean, dry and intact, with JEREMIAS drain, secured, noted light pink washed output. TC hose and SCDs ON, bed set to lowest point.    0240 Pt awake, dc'd ET per PACU protocol, shifted O2 support to 3 L/min via mask, respirations regular and spontaneous.    0250 Asked pt if he wants to update family at this time, opted to update family later this morning.    0325 Gave report to Jenny RN (Ortho RN) thru telephone call, all questions answered.    0346 Vital signs stable, pt to room with transport, pt belongings with him.

## 2020-03-15 NOTE — ANESTHESIA TIME REPORT
Anesthesia Start and Stop Event Times     Date Time Event    3/15/2020 0102 Ready for Procedure     0122 Anesthesia Start     0232 Anesthesia Stop        Responsible Staff  03/15/20    Name Role Begin End    Yonatan Roberson III, M.D. Anesth 0122 0232        Preop Diagnosis (Free Text):  Pre-op Diagnosis     acute appendicitis        Preop Diagnosis (Codes):    Post op Diagnosis  Acute appendicitis      Premium Reason  E. Weekend    Comments: emergency

## 2020-03-15 NOTE — ANESTHESIA PROCEDURE NOTES
Airway  Date/Time: 3/15/2020 1:32 AM  Performed by: Yonatan Roberson III, M.D.  Authorized by: Yonatan Roberson III, M.D.     Location:  OR  Urgency:  Elective  Difficult Airway: No    Indications for Airway Management:  Anesthesia  Spontaneous Ventilation: absent    Sedation Level:  Deep  Preoxygenated: Yes    Patient Position:  Sniffing  Final Airway Type:  Endotracheal airway  Final Endotracheal Airway:  ETT  Cuffed: Yes    Technique Used for Successful ETT Placement:  Direct laryngoscopy  Insertion Site:  Oral  Blade Type:  Starr  Laryngoscope Blade/Videolaryngoscope Blade Size:  3  ETT Size (mm):  8.0  Measured from:  Lips  ETT to Lips (cm):  232  Placement Verified by: auscultation and capnometry    Cormack-Lehane Classification:  Grade IIb - view of arytenoids or posterior of glottis only  Number of Attempts at Approach:  1

## 2020-03-15 NOTE — ED TRIAGE NOTES
BIBEMS c/o RLQ pain with n/v/d over x2 days.  +rebound tenderness.  Pt denies ETOH, drug use.  Hx pancreatitis, anxiety, depression.

## 2020-03-15 NOTE — ANESTHESIA POSTPROCEDURE EVALUATION
Patient: Alek Davila    Procedure Summary     Date:  03/15/20 Room / Location:  [facility] 09 / SURGERY [facility]    Anesthesia Start:  0122 Anesthesia Stop:  0232    Procedure:  APPENDECTOMY, LAPAROSCOPIC (N/A Abdomen) Diagnosis:  (acute appendicitis)    Surgeon:  Leonardo Orellana M.D. Responsible Provider:  Yonatan Roberson III, M.D.    Anesthesia Type:  general ASA Status:  2 - Emergent          Final Anesthesia Type: general  Last vitals  BP   Blood Pressure: 123/86    Temp   37.4 °C (99.3 °F)    Pulse   Pulse: 92   Resp   (!) 21    SpO2   96 %      Anesthesia Post Evaluation    Patient location during evaluation: PACU  Patient participation: complete - patient participated  Level of consciousness: awake and alert  Pain score: 0    Airway patency: patent  Anesthetic complications: no  Cardiovascular status: hemodynamically stable  Respiratory status: acceptable  Hydration status: euvolemic    PONV: none           Nurse Pain Score: 0 (NPRS)

## 2020-03-15 NOTE — PROGRESS NOTES
Dr Orellana saw patient. Clear liquid diet and if patient tolerates, progress to full liquid at dinner. Ok to hep lock IV if patient tolerating PO fluids.

## 2020-03-15 NOTE — ANESTHESIA QCDR
2019 [facility] Clinical Data Registry (for Quality Improvement)     Postoperative nausea/vomiting risk protocol (Adult = 18 yrs and Pediatric 3-17 yrs)- (430 and 463)  General inhalation anesthetic (NOT TIVA) with PONV risk factors: Yes  Provision of anti-emetic therapy with at least 2 different classes of agents: Yes   Patient DID NOT receive anti-emetic therapy and reason is documented in Medical Record:  N/A    Multimodal Pain Management- (477)  Non-emergent surgery AND patient age >= 18: No  Use of Multimodal Pain Management, two or more drugs and/or interventions, NOT including systemic opioids:   Exception: Documented allergy to multiple classes of analgesics:     Smoking Abstinence (404)  Patient is current smoker (cigarette, pipe, e-cig, marijuanna): Yes  Elective Surgery: No  Abstinence instructions provided prior to day of surgery:   Patient abstained from smoking on day of surgery:     Pre-Op Beta-Blocker in Isolated CABG (44)  Isolated CABG AND patient age >= 18: No  Beta-blocker admin within 24 hours of surgical incision:   Exception:of medical reason(s) for not administering beta blocker within 24 hours prior to surgical incision (e.g., not  indicated,other medical reason):     PACU assessment of acute postoperative pain prior to Anesthesia Care End- Applies to Patients Age = 18- (ABG7)  Initial PACU pain score is which of the following: < 7/10  Patient unable to report pain score: N/A    Post-anesthetic transfer of care checklist/protocol to PACU/ICU- (426 and 427)  Upon conclusion of case, patient transferred to which of the following locations: PACU/Non-ICU  Use of transfer checklist/protocol: Yes  Exclusion: Service Performed in Patient Hospital Room (and thus did not require transfer): N/A  Unplanned admission to ICU related to anesthesia service up through end of PACU care- (MD51)  Unplanned admission to ICU (not initially anticipated at anesthesia start time): No

## 2020-03-15 NOTE — PROGRESS NOTES
2 RN skin check complete with PATTI Phillips  Devices in place: JEREMIAS drain to abdomen, bulb to suction  Skin assessed under devices: intact  Confirmed pressure ulcers found on: N/A  New potential pressure ulcers noted: N/A  The following interventions in place: assessing and draining frequently

## 2020-03-15 NOTE — OR SURGEON
Immediate Post OP Note    PreOp Diagnosis: Acute Appendicitis    PostOp Diagnosis: Acute Appendicitis with perforation    Procedure(s):  APPENDECTOMY, LAPAROSCOPIC - Wound Class: Dirty or Infected    Surgeon(s):  Leonardo Orellana M.D.    Anesthesiologist/Type of Anesthesia: GET  Anesthesiologist: Yonatan Roberson III, M.D./General    Surgical Staff:  Circulator: Connie Cruz R.N.  Scrub Person: Fritz Aguilar    Specimens removed if any:  ID Type Source Tests Collected by Time Destination   A : Appendix Tissue Appendix PATHOLOGY SPECIMEN Leonardo Orellana M.D. 3/15/2020 12:40 AM        Estimated Blood Loss: < 5 cc    Findings: perforated appendix at the base with contamination    Complications: none.    10 Fr JEREMIAS in place        3/15/2020 2:19 AM Leonardo Orellana M.D.

## 2020-03-15 NOTE — CARE PLAN
Problem: Infection  Goal: Will remain free from infection  Outcome: PROGRESSING AS EXPECTED  Note: Pt afebrile. Pt will be receiving scheduled antibiotics.     Problem: Bowel/Gastric:  Goal: Normal bowel function is maintained or improved  Outcome: PROGRESSING AS EXPECTED

## 2020-03-16 LAB
BASOPHILS # BLD AUTO: 0.1 % (ref 0–1.8)
BASOPHILS # BLD: 0.01 K/UL (ref 0–0.12)
EOSINOPHIL # BLD AUTO: 0.03 K/UL (ref 0–0.51)
EOSINOPHIL NFR BLD: 0.2 % (ref 0–6.9)
ERYTHROCYTE [DISTWIDTH] IN BLOOD BY AUTOMATED COUNT: 41.2 FL (ref 35.9–50)
HCT VFR BLD AUTO: 41.5 % (ref 42–52)
HGB BLD-MCNC: 14.2 G/DL (ref 14–18)
IMM GRANULOCYTES # BLD AUTO: 0.1 K/UL (ref 0–0.11)
IMM GRANULOCYTES NFR BLD AUTO: 0.8 % (ref 0–0.9)
LYMPHOCYTES # BLD AUTO: 1.51 K/UL (ref 1–4.8)
LYMPHOCYTES NFR BLD: 11.6 % (ref 22–41)
MCH RBC QN AUTO: 30 PG (ref 27–33)
MCHC RBC AUTO-ENTMCNC: 34.2 G/DL (ref 33.7–35.3)
MCV RBC AUTO: 87.7 FL (ref 81.4–97.8)
MONOCYTES # BLD AUTO: 1.12 K/UL (ref 0–0.85)
MONOCYTES NFR BLD AUTO: 8.6 % (ref 0–13.4)
NEUTROPHILS # BLD AUTO: 10.26 K/UL (ref 1.82–7.42)
NEUTROPHILS NFR BLD: 78.7 % (ref 44–72)
NRBC # BLD AUTO: 0 K/UL
NRBC BLD-RTO: 0 /100 WBC
PATHOLOGY CONSULT NOTE: NORMAL
PLATELET # BLD AUTO: 231 K/UL (ref 164–446)
PMV BLD AUTO: 9.5 FL (ref 9–12.9)
RBC # BLD AUTO: 4.73 M/UL (ref 4.7–6.1)
WBC # BLD AUTO: 13 K/UL (ref 4.8–10.8)

## 2020-03-16 PROCEDURE — 700101 HCHG RX REV CODE 250: Performed by: ANESTHESIOLOGY

## 2020-03-16 PROCEDURE — A9270 NON-COVERED ITEM OR SERVICE: HCPCS | Performed by: SURGERY

## 2020-03-16 PROCEDURE — 770006 HCHG ROOM/CARE - MED/SURG/GYN SEMI*

## 2020-03-16 PROCEDURE — 700102 HCHG RX REV CODE 250 W/ 637 OVERRIDE(OP): Performed by: SURGERY

## 2020-03-16 PROCEDURE — 36415 COLL VENOUS BLD VENIPUNCTURE: CPT

## 2020-03-16 PROCEDURE — 85025 COMPLETE CBC W/AUTO DIFF WBC: CPT

## 2020-03-16 PROCEDURE — 700111 HCHG RX REV CODE 636 W/ 250 OVERRIDE (IP): Performed by: SURGERY

## 2020-03-16 PROCEDURE — 700105 HCHG RX REV CODE 258: Performed by: SURGERY

## 2020-03-16 RX ORDER — KETOROLAC TROMETHAMINE 30 MG/ML
30 INJECTION, SOLUTION INTRAMUSCULAR; INTRAVENOUS EVERY 6 HOURS PRN
Status: DISCONTINUED | OUTPATIENT
Start: 2020-03-16 | End: 2020-03-19 | Stop reason: HOSPADM

## 2020-03-16 RX ORDER — DOCUSATE SODIUM 100 MG/1
100 CAPSULE, LIQUID FILLED ORAL 2 TIMES DAILY
Status: DISCONTINUED | OUTPATIENT
Start: 2020-03-16 | End: 2020-03-19 | Stop reason: HOSPADM

## 2020-03-16 RX ORDER — POLYETHYLENE GLYCOL 3350 17 G/17G
1 POWDER, FOR SOLUTION ORAL DAILY
Status: DISCONTINUED | OUTPATIENT
Start: 2020-03-16 | End: 2020-03-19 | Stop reason: HOSPADM

## 2020-03-16 RX ORDER — METRONIDAZOLE 500 MG/1
500 TABLET ORAL EVERY 8 HOURS
Status: DISCONTINUED | OUTPATIENT
Start: 2020-03-16 | End: 2020-03-19 | Stop reason: HOSPADM

## 2020-03-16 RX ORDER — SIMETHICONE 80 MG
40 TABLET,CHEWABLE ORAL EVERY 6 HOURS PRN
Status: DISCONTINUED | OUTPATIENT
Start: 2020-03-16 | End: 2020-03-19 | Stop reason: HOSPADM

## 2020-03-16 RX ADMIN — OXYCODONE AND ACETAMINOPHEN 1 TABLET: 10; 325 TABLET ORAL at 16:14

## 2020-03-16 RX ADMIN — METRONIDAZOLE 500 MG: 500 TABLET ORAL at 16:14

## 2020-03-16 RX ADMIN — MORPHINE SULFATE 4 MG: 4 INJECTION INTRAVENOUS at 00:49

## 2020-03-16 RX ADMIN — OXYCODONE AND ACETAMINOPHEN 1 TABLET: 10; 325 TABLET ORAL at 04:35

## 2020-03-16 RX ADMIN — METRONIDAZOLE 500 MG: 500 INJECTION, SOLUTION INTRAVENOUS at 08:16

## 2020-03-16 RX ADMIN — MORPHINE SULFATE 4 MG: 4 INJECTION INTRAVENOUS at 08:14

## 2020-03-16 RX ADMIN — SIMETHICONE CHEW TAB 80 MG 40 MG: 80 TABLET ORAL at 13:33

## 2020-03-16 RX ADMIN — OXYCODONE AND ACETAMINOPHEN 1 TABLET: 10; 325 TABLET ORAL at 22:14

## 2020-03-16 RX ADMIN — METRONIDAZOLE 500 MG: 500 INJECTION, SOLUTION INTRAVENOUS at 00:45

## 2020-03-16 RX ADMIN — CEFTRIAXONE SODIUM 2 G: 2 INJECTION, POWDER, FOR SOLUTION INTRAMUSCULAR; INTRAVENOUS at 13:33

## 2020-03-16 RX ADMIN — KETOROLAC TROMETHAMINE 30 MG: 30 INJECTION, SOLUTION INTRAMUSCULAR at 20:27

## 2020-03-16 NOTE — PROGRESS NOTES
Surgical Progress Note    Author: Marce Coyle M.D. Date & Time created: 3/16/2020   7:26 AM     Interval Events:  Seeing pt for Dr. Orellana.  Feels better, pain controlled.  Denies nausea on liquids.  Diet advanced last night.  Ambulated with encouragement of RN.  No flatus yet.    ROS  Hemodynamics:  Temp (24hrs), Av °C (98.6 °F), Min:36.8 °C (98.2 °F), Max:37.1 °C (98.8 °F)  Temperature: 37.1 °C (98.7 °F)  Pulse  Av.1  Min: 63  Max: 101   Blood Pressure: 133/83     Respiratory:    Respiration: 17, Pulse Oximetry: 93 %        RUL Breath Sounds: Clear, RML Breath Sounds: Clear, RLL Breath Sounds: Diminished, BRITTANEY Breath Sounds: Clear, LLL Breath Sounds: Diminished  Neuro:  GCS       Fluids:    Intake/Output Summary (Last 24 hours) at 3/16/2020 0777  Last data filed at 3/16/2020 0435  Gross per 24 hour   Intake 2710 ml   Output 3350 ml   Net -640 ml        Current Diet Order   Procedures   • Diet Order Full Liquid     Physical Exam   Awake, very pleasant.  Abdomen distended, diffusely tender as expected.  Dressings and incisions c/d/i.  No erythema.      Labs:  Recent Results (from the past 24 hour(s))   CBC with Differential    Collection Time: 20  6:20 AM   Result Value Ref Range    WBC 13.0 (H) 4.8 - 10.8 K/uL    RBC 4.73 4.70 - 6.10 M/uL    Hemoglobin 14.2 14.0 - 18.0 g/dL    Hematocrit 41.5 (L) 42.0 - 52.0 %    MCV 87.7 81.4 - 97.8 fL    MCH 30.0 27.0 - 33.0 pg    MCHC 34.2 33.7 - 35.3 g/dL    RDW 41.2 35.9 - 50.0 fL    Platelet Count 231 164 - 446 K/uL    MPV 9.5 9.0 - 12.9 fL    Neutrophils-Polys 78.70 (H) 44.00 - 72.00 %    Lymphocytes 11.60 (L) 22.00 - 41.00 %    Monocytes 8.60 0.00 - 13.40 %    Eosinophils 0.20 0.00 - 6.90 %    Basophils 0.10 0.00 - 1.80 %    Immature Granulocytes 0.80 0.00 - 0.90 %    Nucleated RBC 0.00 /100 WBC    Neutrophils (Absolute) 10.26 (H) 1.82 - 7.42 K/uL    Lymphs (Absolute) 1.51 1.00 - 4.80 K/uL    Monos (Absolute) 1.12 (H) 0.00 - 0.85 K/uL    Eos (Absolute)  0.03 0.00 - 0.51 K/uL    Baso (Absolute) 0.01 0.00 - 0.12 K/uL    Immature Granulocytes (abs) 0.10 0.00 - 0.11 K/uL    NRBC (Absolute) 0.00 K/uL     Medical Decision Making, by Problem:  Active Hospital Problems    Diagnosis   • Acute appendicitis with localized peritonitis, without perforation or abscess [K35.30]     Plan:  Labs improving but not yet normal.  Continue antibiotics.  Recheck labs tomorrow.  Post op ileus as expected.  Diet as tolerated, pt advised to go slowly.  Ambulate.  Saline lock IV, pt voiding abundantly.      Quality Measures:  Quality-Core Measures

## 2020-03-16 NOTE — PROGRESS NOTES
Patient c/o pressure and discomfort to back of head, right side. Will notify surgeon. Ok to hep lock IV. Monitor discomfort.

## 2020-03-16 NOTE — CARE PLAN
Problem: Mobility  Goal: Risk for activity intolerance will decrease  Outcome: PROGRESSING AS EXPECTED  Intervention: Encourage patient to increase activity level in collaboration with Interdisciplinary Team  Note: Pt ambulated 500 feet in halls with steady gait.      Problem: Bowel/Gastric:  Goal: Normal bowel function is maintained or improved  Outcome: PROGRESSING SLOWER THAN EXPECTED  Intervention: Educate patient and significant other/support system about diet, fluid intake, medications and activity to promote bowel function  Note: No bm my shift, ambulating in halls po fluids encouraged.      Problem: Pain Management  Goal: Pain level will decrease to patient's comfort goal  Outcome: PROGRESSING SLOWER THAN EXPECTED  Intervention: Follow pain managment plan developed in collaboration with patient and Interdisciplinary Team  Note: Morphine 4 mg ivp given x 1, po pain medication provided, no s/s of distress noted, see pain flowsheet for more detail.

## 2020-03-16 NOTE — PROGRESS NOTES
3/15/2020 @ 20:00  Ambulated pt in halls 500 ft with steady gait, burping, hasn't passed gas yet. Utilizing IS, on 2L nasal cannula 96%, RR 17 even and unlabored. Pain med given (see pain flowsheet for more detail)  AAOX4, assessment complete via flowsheet, poc reviewed, verb understanding, call light within reach, voices no needs or concerns at this time, will continue to monitor.

## 2020-03-16 NOTE — CARE PLAN
Problem: Safety  Goal: Will remain free from falls  Outcome: PROGRESSING AS EXPECTED  Intervention: Implement fall precautions  Flowsheets (Taken 3/16/2020 0811)  Environmental Precautions:   Treaded Slipper Socks on Patient   Bed in Low Position   Communication Sign for Patients & Families   Mobility Assessed & Appropriate Sign Placed   Personal Belongings, Wastebasket, Call Bell etc. in Easy Reach   Transferred to Stronger Side   Report Given to Other Health Care Providers Regarding Fall Risk  Note: Safety precautions in place. Call light within reach. Bed is low and in locked position. Hourly rounding in place.       Problem: Bowel/Gastric:  Goal: Normal bowel function is maintained or improved  Outcome: PROGRESSING AS EXPECTED  Intervention: Educate patient and significant other/support system about diet, fluid intake, medications and activity to promote bowel function  Note: Goal is to advance the diet to GI soft per orders  Pt reports pain to the abdomen. Morphine given per orders. See MAR

## 2020-03-16 NOTE — PROGRESS NOTES
Pt has been experiencing gas pain.   Pt has not ha a bowel movement yet  Notified Dr. Coyle  Orders received  See MAR

## 2020-03-17 LAB
BASOPHILS # BLD AUTO: 0.4 % (ref 0–1.8)
BASOPHILS # BLD: 0.04 K/UL (ref 0–0.12)
EOSINOPHIL # BLD AUTO: 0.31 K/UL (ref 0–0.51)
EOSINOPHIL NFR BLD: 2.9 % (ref 0–6.9)
ERYTHROCYTE [DISTWIDTH] IN BLOOD BY AUTOMATED COUNT: 41.2 FL (ref 35.9–50)
HCT VFR BLD AUTO: 42.5 % (ref 42–52)
HGB BLD-MCNC: 14 G/DL (ref 14–18)
IMM GRANULOCYTES # BLD AUTO: 0.05 K/UL (ref 0–0.11)
IMM GRANULOCYTES NFR BLD AUTO: 0.5 % (ref 0–0.9)
LYMPHOCYTES # BLD AUTO: 1.48 K/UL (ref 1–4.8)
LYMPHOCYTES NFR BLD: 13.9 % (ref 22–41)
MCH RBC QN AUTO: 29.2 PG (ref 27–33)
MCHC RBC AUTO-ENTMCNC: 32.9 G/DL (ref 33.7–35.3)
MCV RBC AUTO: 88.7 FL (ref 81.4–97.8)
MONOCYTES # BLD AUTO: 0.89 K/UL (ref 0–0.85)
MONOCYTES NFR BLD AUTO: 8.4 % (ref 0–13.4)
NEUTROPHILS # BLD AUTO: 7.86 K/UL (ref 1.82–7.42)
NEUTROPHILS NFR BLD: 73.9 % (ref 44–72)
NRBC # BLD AUTO: 0 K/UL
NRBC BLD-RTO: 0 /100 WBC
PLATELET # BLD AUTO: 252 K/UL (ref 164–446)
PMV BLD AUTO: 9.2 FL (ref 9–12.9)
RBC # BLD AUTO: 4.79 M/UL (ref 4.7–6.1)
WBC # BLD AUTO: 10.6 K/UL (ref 4.8–10.8)

## 2020-03-17 PROCEDURE — 700112 HCHG RX REV CODE 229: Performed by: SURGERY

## 2020-03-17 PROCEDURE — 700105 HCHG RX REV CODE 258: Performed by: SURGERY

## 2020-03-17 PROCEDURE — 700102 HCHG RX REV CODE 250 W/ 637 OVERRIDE(OP): Performed by: SURGERY

## 2020-03-17 PROCEDURE — A9270 NON-COVERED ITEM OR SERVICE: HCPCS | Performed by: SURGERY

## 2020-03-17 PROCEDURE — 700111 HCHG RX REV CODE 636 W/ 250 OVERRIDE (IP): Performed by: SURGERY

## 2020-03-17 PROCEDURE — 36415 COLL VENOUS BLD VENIPUNCTURE: CPT

## 2020-03-17 PROCEDURE — 85025 COMPLETE CBC W/AUTO DIFF WBC: CPT

## 2020-03-17 PROCEDURE — 770006 HCHG ROOM/CARE - MED/SURG/GYN SEMI*

## 2020-03-17 RX ADMIN — DOCUSATE SODIUM 100 MG: 100 CAPSULE, LIQUID FILLED ORAL at 17:05

## 2020-03-17 RX ADMIN — METRONIDAZOLE 500 MG: 500 TABLET ORAL at 17:05

## 2020-03-17 RX ADMIN — OXYCODONE AND ACETAMINOPHEN 1 TABLET: 10; 325 TABLET ORAL at 18:18

## 2020-03-17 RX ADMIN — KETOROLAC TROMETHAMINE 30 MG: 30 INJECTION, SOLUTION INTRAMUSCULAR at 11:55

## 2020-03-17 RX ADMIN — CEFTRIAXONE SODIUM 2 G: 2 INJECTION, POWDER, FOR SOLUTION INTRAMUSCULAR; INTRAVENOUS at 11:50

## 2020-03-17 RX ADMIN — DOCUSATE SODIUM 100 MG: 100 CAPSULE, LIQUID FILLED ORAL at 05:59

## 2020-03-17 RX ADMIN — OXYCODONE AND ACETAMINOPHEN 1 TABLET: 10; 325 TABLET ORAL at 11:54

## 2020-03-17 RX ADMIN — KETOROLAC TROMETHAMINE 30 MG: 30 INJECTION, SOLUTION INTRAMUSCULAR at 21:09

## 2020-03-17 RX ADMIN — METRONIDAZOLE 500 MG: 500 TABLET ORAL at 09:07

## 2020-03-17 RX ADMIN — OXYCODONE AND ACETAMINOPHEN 1 TABLET: 10; 325 TABLET ORAL at 05:59

## 2020-03-17 RX ADMIN — KETOROLAC TROMETHAMINE 30 MG: 30 INJECTION, SOLUTION INTRAMUSCULAR at 02:10

## 2020-03-17 RX ADMIN — METRONIDAZOLE 500 MG: 500 TABLET ORAL at 01:03

## 2020-03-17 NOTE — CARE PLAN
Problem: Bowel/Gastric:  Goal: Will not experience complications related to bowel motility  Outcome: PROGRESSING SLOWER THAN EXPECTED  Flowsheets (Taken 3/16/2020 0811 by Adriane Lucas R.N.)  Last BM: 03/13/20  Note: Patient has not had abowel movement since surgery. Pt reports flatus. Bowel sounds hypoactive in all four quadrants.       Problem: Knowledge Deficit  Goal: Knowledge of disease process/condition, treatment plan, diagnostic tests, and medications will improve  Outcome: PROGRESSING AS EXPECTED  Note: Reviewed POC including safety, mobility, pain management, medication administration, DVT prophylaxis, and discharge.

## 2020-03-17 NOTE — CARE PLAN
Problem: Safety  Goal: Will remain free from falls  Outcome: PROGRESSING AS EXPECTED  Intervention: Implement fall precautions  Flowsheets (Taken 3/17/2020 0905)  Environmental Precautions:   Treaded Slipper Socks on Patient   Personal Belongings, Wastebasket, Call Bell etc. in Easy Reach   Transferred to Stronger Side   Report Given to Other Health Care Providers Regarding Fall Risk   Bed in Low Position   Mobility Assessed & Appropriate Sign Placed   Communication Sign for Patients & Families  Note: Safety precautions in place. Call light within reach. Bed is low and in locked position. Hourly rounding in place.       Problem: Bowel/Gastric:  Goal: Normal bowel function is maintained or improved  Outcome: PROGRESSING AS EXPECTED  Intervention: Educate patient and significant other/support system about diet, fluid intake, medications and activity to promote bowel function  Note: Pt had a BM last night per pt  Pt reporting gas pain is better today  Encouraged ambulation and hydration. Pt verbalized understanding.

## 2020-03-17 NOTE — PROGRESS NOTES
Surgical Progress Note    Author: Marce Coyle M.D. Date & Time created: 3/17/2020   7:56 AM     Interval Events:  Feels much better.  Passing flatus, tolerating PO well.  Ambulating easier.  On oral pain meds.    ROS  Hemodynamics:  Temp (24hrs), Av.9 °C (98.4 °F), Min:36.6 °C (97.8 °F), Max:37.2 °C (98.9 °F)  Temperature: 36.9 °C (98.4 °F)  Pulse  Av.4  Min: 63  Max: 101   Blood Pressure: 123/76     Respiratory:    Respiration: 17, Pulse Oximetry: 94 %        RUL Breath Sounds: Clear, RML Breath Sounds: Clear, RLL Breath Sounds: Diminished, BRITTANEY Breath Sounds: Clear, LLL Breath Sounds: Diminished  Neuro:  GCS       Fluids:    Intake/Output Summary (Last 24 hours) at 3/17/2020 0756  Last data filed at 3/17/2020 0500  Gross per 24 hour   Intake 240 ml   Output 1445 ml   Net -1205 ml        Current Diet Order   Procedures   • Diet Order Regular, Low Fiber(GI Soft)     Physical Exam   Alert, comfortable, interactive.  Abdomen soft, less distended.  Cloudy fluid in JEREMIAS.     Labs:  No results found for this or any previous visit (from the past 24 hour(s)).  Medical Decision Making, by Problem:  Active Hospital Problems    Diagnosis   • Acute appendicitis with localized peritonitis, without perforation or abscess [K35.30]     Plan:  Continue IV antibiotics given perforation and still elevated WBC.  Keep JEREMIAS in for today.  Dr. Orellana or coverage will reassess tomorrow, hopeful for DC home without drain.  Advised pt to minimize narcotics if able.      Quality Measures:  Quality-Core Measures

## 2020-03-18 PROCEDURE — A9270 NON-COVERED ITEM OR SERVICE: HCPCS | Performed by: SURGERY

## 2020-03-18 PROCEDURE — 770006 HCHG ROOM/CARE - MED/SURG/GYN SEMI*

## 2020-03-18 PROCEDURE — 700105 HCHG RX REV CODE 258: Performed by: SURGERY

## 2020-03-18 PROCEDURE — 700111 HCHG RX REV CODE 636 W/ 250 OVERRIDE (IP): Performed by: SURGERY

## 2020-03-18 PROCEDURE — 700102 HCHG RX REV CODE 250 W/ 637 OVERRIDE(OP): Performed by: SURGERY

## 2020-03-18 RX ORDER — IBUPROFEN 600 MG/1
600 TABLET ORAL EVERY 4 HOURS PRN
Status: DISCONTINUED | OUTPATIENT
Start: 2020-03-18 | End: 2020-03-19 | Stop reason: HOSPADM

## 2020-03-18 RX ADMIN — OXYCODONE AND ACETAMINOPHEN 1 TABLET: 10; 325 TABLET ORAL at 07:22

## 2020-03-18 RX ADMIN — METRONIDAZOLE 500 MG: 500 TABLET ORAL at 16:42

## 2020-03-18 RX ADMIN — METRONIDAZOLE 500 MG: 500 TABLET ORAL at 00:38

## 2020-03-18 RX ADMIN — OXYCODONE AND ACETAMINOPHEN 1 TABLET: 10; 325 TABLET ORAL at 00:38

## 2020-03-18 RX ADMIN — CEFTRIAXONE SODIUM 2 G: 2 INJECTION, POWDER, FOR SOLUTION INTRAMUSCULAR; INTRAVENOUS at 13:01

## 2020-03-18 RX ADMIN — METRONIDAZOLE 500 MG: 500 TABLET ORAL at 07:22

## 2020-03-18 RX ADMIN — OXYCODONE AND ACETAMINOPHEN 1 TABLET: 10; 325 TABLET ORAL at 16:44

## 2020-03-18 RX ADMIN — IBUPROFEN 600 MG: 600 TABLET ORAL at 16:42

## 2020-03-18 NOTE — PROGRESS NOTES
Surgical Progress Note    Author: Marce Coyle M.D. Date & Time created: 3/18/2020   7:50 AM     Interval Events:  Feels much better overall, but worried about intermittent severe gas pains.  No nausea, tolerating PO well.  Passing flatus.  Says he has no support at home today, but his ex-wife is helping arrange help for tomorrow, and strongly desires one more day here.    ROS  Hemodynamics:  Temp (24hrs), Av.9 °C (98.5 °F), Min:36.3 °C (97.4 °F), Max:37.6 °C (99.6 °F)  Temperature: 37.1 °C (98.8 °F)  Pulse  Av.2  Min: 63  Max: 101   Blood Pressure: 122/81     Respiratory:    Respiration: 16, Pulse Oximetry: 98 %        RUL Breath Sounds: Clear, RML Breath Sounds: Clear, RLL Breath Sounds: Diminished, BRITTANEY Breath Sounds: Clear, LLL Breath Sounds: Diminished  Neuro:  GCS       Fluids:    Intake/Output Summary (Last 24 hours) at 3/18/2020 0750  Last data filed at 3/18/2020 0600  Gross per 24 hour   Intake 150 ml   Output 1380 ml   Net -1230 ml        Current Diet Order   Procedures   • Diet Order Regular, Low Fiber(GI Soft)     Physical Exam   Alert, NAD.  Abdomen soft, minimally distended.  Scant fluid in JEREMIAS.     Labs:  Recent Results (from the past 24 hour(s))   CBC WITH DIFFERENTIAL    Collection Time: 20 10:58 AM   Result Value Ref Range    WBC 10.6 4.8 - 10.8 K/uL    RBC 4.79 4.70 - 6.10 M/uL    Hemoglobin 14.0 14.0 - 18.0 g/dL    Hematocrit 42.5 42.0 - 52.0 %    MCV 88.7 81.4 - 97.8 fL    MCH 29.2 27.0 - 33.0 pg    MCHC 32.9 (L) 33.7 - 35.3 g/dL    RDW 41.2 35.9 - 50.0 fL    Platelet Count 252 164 - 446 K/uL    MPV 9.2 9.0 - 12.9 fL    Neutrophils-Polys 73.90 (H) 44.00 - 72.00 %    Lymphocytes 13.90 (L) 22.00 - 41.00 %    Monocytes 8.40 0.00 - 13.40 %    Eosinophils 2.90 0.00 - 6.90 %    Basophils 0.40 0.00 - 1.80 %    Immature Granulocytes 0.50 0.00 - 0.90 %    Nucleated RBC 0.00 /100 WBC    Neutrophils (Absolute) 7.86 (H) 1.82 - 7.42 K/uL    Lymphs (Absolute) 1.48 1.00 - 4.80 K/uL    Monos  (Absolute) 0.89 (H) 0.00 - 0.85 K/uL    Eos (Absolute) 0.31 0.00 - 0.51 K/uL    Baso (Absolute) 0.04 0.00 - 0.12 K/uL    Immature Granulocytes (abs) 0.05 0.00 - 0.11 K/uL    NRBC (Absolute) 0.00 K/uL     Medical Decision Making, by Problem:  Active Hospital Problems    Diagnosis   • Acute appendicitis with localized peritonitis, without perforation or abscess [K35.30]     Plan:  Continue IV ABX for another 24h, then switch to oral antibiotics tomorrow.  No narcotics today.  Keep drain in one more day, then plan for discharge tomorrow on oral antibiotics, OTC pain meds, and without drain unless significant change overnight.  Pt very happy with plan.    Quality Measures:  Quality-Core Measures

## 2020-03-18 NOTE — CARE PLAN
Problem: Pain Management  Goal: Pain level will decrease to patient's comfort goal  Outcome: PROGRESSING AS EXPECTED    -Pt states pain well controlled with current PRN and scheduled medications. Pt resting comfortably, A/ox4.     Problem: Mobility  Goal: Risk for activity intolerance will decrease  Outcome: PROGRESSING AS EXPECTED    -Pt up self, ambulating around unit.

## 2020-03-19 VITALS
BODY MASS INDEX: 27.76 KG/M2 | SYSTOLIC BLOOD PRESSURE: 129 MMHG | WEIGHT: 216.27 LBS | OXYGEN SATURATION: 95 % | RESPIRATION RATE: 17 BRPM | HEIGHT: 74 IN | TEMPERATURE: 97.7 F | HEART RATE: 65 BPM | DIASTOLIC BLOOD PRESSURE: 95 MMHG

## 2020-03-19 PROCEDURE — A9270 NON-COVERED ITEM OR SERVICE: HCPCS | Performed by: SURGERY

## 2020-03-19 PROCEDURE — 700102 HCHG RX REV CODE 250 W/ 637 OVERRIDE(OP): Performed by: SURGERY

## 2020-03-19 PROCEDURE — 700112 HCHG RX REV CODE 229: Performed by: SURGERY

## 2020-03-19 RX ORDER — AMOXICILLIN AND CLAVULANATE POTASSIUM 875; 125 MG/1; MG/1
1 TABLET, FILM COATED ORAL 2 TIMES DAILY
Qty: 8 TAB | Refills: 0 | Status: ON HOLD | OUTPATIENT
Start: 2020-03-19 | End: 2020-03-22

## 2020-03-19 RX ADMIN — OXYCODONE AND ACETAMINOPHEN 1 TABLET: 10; 325 TABLET ORAL at 13:08

## 2020-03-19 RX ADMIN — METRONIDAZOLE 500 MG: 500 TABLET ORAL at 00:17

## 2020-03-19 RX ADMIN — OXYCODONE AND ACETAMINOPHEN 1 TABLET: 10; 325 TABLET ORAL at 00:17

## 2020-03-19 RX ADMIN — OXYCODONE AND ACETAMINOPHEN 1 TABLET: 10; 325 TABLET ORAL at 07:09

## 2020-03-19 RX ADMIN — METRONIDAZOLE 500 MG: 500 TABLET ORAL at 07:48

## 2020-03-19 RX ADMIN — DOCUSATE SODIUM 100 MG: 100 CAPSULE, LIQUID FILLED ORAL at 00:17

## 2020-03-19 NOTE — DISCHARGE SUMMARY
DATE OF ADMISSION:  03/14/2020    DATE OF DISCHARGE:  03/19/2020    PRINCIPAL DIAGNOSES:  Focal perforated acute appendicitis.    HOSPITAL COURSE:  This is a very pleasant 39-year-old gentleman who underwent   laparoscopic appendectomy by Dr. Orellana for a focally perforated, contained   acute appendicitis.  Postoperatively, he has done well.  His ileus has   resolved and he is tolerating a soft diet without nausea or vomiting.  His   abdomen is soft and nondistended.  He is passing stool and flatus.  JEREMIAS drain   has put out benign appearing fluid and has decreased to meet criteria for   removal.  His white blood cell count has normalized as well.  He was   maintained on IV antibiotics during his hospitalization and will be discharged   home to complete a 10-day course of antibiotics.  He was advised to ambulate   regularly, avoid lifting over 5 pounds, and is given allowance to shower, but   advised to avoid baths for 4 weeks.  He was advised to call the office   tomorrow to schedule a routine postoperative appointment with Dr. Orellana.  He   was also advised to call if he develops high fevers or chills, worsening   abdominal pain, worsening nausea and vomiting, or other questions or concerns.    He states good understanding of these instructions and is ready to be   discharged home.       ____________________________________     MD BRANDYN Garvin / [facility]    DD:  03/19/2020 11:59:20  DT:  03/19/2020 12:34:05    D#:  [id number]  Job#:  [id number]

## 2020-03-19 NOTE — PROGRESS NOTES
Surgical Progress Note    Author: Marce Coyle M.D. Date & Time created: 3/19/2020   11:51 AM     Interval Events:  Feels well.  Tolerating PO, no nausea.  Passing flatus.  Pain controlled with OTC meds.    ROS  Hemodynamics:  Temp (24hrs), Av °C (98.6 °F), Min:36.5 °C (97.7 °F), Max:37.6 °C (99.7 °F)  Temperature: 36.5 °C (97.7 °F)  Pulse  Av  Min: 61  Max: 101   Blood Pressure: 129/95     Respiratory:    Respiration: 17, Pulse Oximetry: 95 %        RUL Breath Sounds: Clear, RML Breath Sounds: Clear, RLL Breath Sounds: Clear, BRITTANEY Breath Sounds: Clear, LLL Breath Sounds: Clear  Neuro:  GCS       Fluids:    Intake/Output Summary (Last 24 hours) at 3/19/2020 1151  Last data filed at 3/19/2020 1000  Gross per 24 hour   Intake 480 ml   Output 945 ml   Net -465 ml        Current Diet Order   Procedures   • Diet Order Regular, Low Fiber(GI Soft)     Physical Exam   Alert, comfortable.  Abdomen soft, nondistended.  Dressings dry.  Scant amount of fluid in JEREMIAS.    Labs:  No results found for this or any previous visit (from the past 24 hour(s)).  Medical Decision Making, by Problem:  Active Hospital Problems    Diagnosis   • Acute appendicitis with localized peritonitis, without perforation or abscess [K35.30]     Plan:  DC drain.  Discharge home. DC summary [id number]    Quality Measures:  Quality-Core Measures

## 2020-03-19 NOTE — CARE PLAN
Problem: Bowel/Gastric:  Goal: Normal bowel function is maintained or improved  Outcome: PROGRESSING AS EXPECTED    -Positive bowel sounds, Pt reports having gas and gaily Bms.     Problem: Pain Management  Goal: Pain level will decrease to patient's comfort goal  Outcome: PROGRESSING AS EXPECTED    -Pain controlled with ordered medications. Resting comfortably.     Problem: Mobility  Goal: Risk for activity intolerance will decrease  Outcome: PROGRESSING AS EXPECTED    -Pt independent on unit.

## 2020-03-19 NOTE — DISCHARGE PLANNING
Anticipated Discharge Disposition: Home    Action: Spoke to pt at bedside, pt states he lives independently in [place], [place].  Pt denies DME, pt drives.  Pt states he has a PCP who he saw 2 weeks ago. Emergency Contact Yanet Bernstein (Friend) [phone]    Barriers to Discharge: medical clearance    Plan: f/u with medical team, pt        Care Transition Team Assessment    Information Source  Orientation : Oriented x 4  Information Given By: Patient  Who is responsible for making decisions for patient? : Patient    Readmission Evaluation  Is this a readmission?: No    Elopement Risk  Legal Hold: No  Ambulatory or Self Mobile in Wheelchair: Yes  Disoriented: No  Psychiatric Symptoms: None  History of Wandering: No  Elopement this Admit: No  Vocalizing Wanting to Leave: No  Displays Behaviors, Body Language Wanting to Leave: No-Not at Risk for Elopement  Elopement Risk: Not at Risk for Elopement    Interdisciplinary Discharge Planning  Patient or legal guardian wants to designate a caregiver (see row info): No    Discharge Preparedness  What is your plan after discharge?: Home with help  What are your discharge supports?: (Friend)  Prior Functional Level: Independent with Activities of Daily Living, Drives Self    Functional Assesment  Prior Functional Level: Independent with Activities of Daily Living, Drives Self         Vision / Hearing Impairment  Vision Impairment : No  Hearing Impairment : No         Advance Directive  Advance Directive?: None  Advance Directive offered?: AD Booklet refused    Domestic Abuse  Have you ever been the victim of abuse or violence?: No  Physical Abuse or Sexual Abuse: No  Verbal Abuse or Emotional Abuse: No  Possible Abuse Reported to:: Not Applicable    Psychological Assessment  History of Substance Abuse: None  History of Psychiatric Problems: No         Anticipated Discharge Information  Anticipated discharge disposition: Home

## 2020-03-19 NOTE — DISCHARGE INSTRUCTIONS
Discharge Instructions    Dr. Marce Coyle's instructions:     After JEREMIAS drain removal.  OK to shower, but no baths for 4 weeks.  Continue oral antibiotics for next 4 days.  Call if high fevers or chills, worsening nausea or abdominal pain.  Call office today or tomorrow to schedule routine PO with Dr. Orellana in 1-2 weeks.  Ambulate regularly.  No lifting over 5 pounds  Discharged to home by car with relative. Discharged via walking, hospital escort: Yes.    Special equipment needed: Not Applicable    Be sure to schedule a follow-up appointment with your primary care doctor or any specialists as instructed.     Discharge Plan:   Influenza Vaccine Indication: Indicated: 9 to 64 years of age  Influenza Vaccine Given - only chart on this line when given: Influenza Vaccine Given (See MAR)    I understand that a diet low in cholesterol, fat, and sodium is recommended for good health. Unless I have been given specific instructions below for another diet, I accept this instruction as my diet prescription.   Other diet: GI Soft Diet    Special Instructions: None    · Is patient discharged on Warfarin / Coumadin?   No       Laparoscopic Appendectomy, Adult  A laparoscopic appendectomy is a surgery to take out the appendix. The appendix is a finger-like structure that is attached to the large intestine. In this surgery, the appendix is removed through two or three small cuts (incisions) with the help of a thin, lighted tube that has a tiny camera on the end (laparoscope).  A laparoscopic appendectomy may be done to prevent an inflamed appendix from bursting (rupturing). Or, it may be done to treat the infection from an appendix that has already ruptured. It is usually done immediately after inflammation of the appendix (appendicitis) is diagnosed.  Tell a health care provider about:  · Any allergies you have.  · All medicines you are taking, including vitamins, herbs, eye drops, creams, and over-the-counter medicines.  · Any  problems you or family members have had with anesthetic medicines.  · Any blood disorders you have.  · Any surgeries you have had.  · Any medical conditions you have.  · Whether you are pregnant or may be pregnant.  What are the risks?  Generally, this is a safe procedure. However, problems may occur, including:  · Infection.  · Bleeding.  · Allergic reactions to medicines.  · Damage to other structures or organs.  · The formation of collections of pus (abscesses).  · Long-lasting pain or scarring at the incision sites or inside the abdomen.  · Blood clots in the legs.  What happens before the procedure?  · Follow instructions from your health care provider about eating or drinking restrictions.  · Ask your health care provider about:  ¨ Changing or stopping your regular medicines. This is especially important if you are taking diabetes medicines or blood thinners.  ¨ Taking medicines such as aspirin and ibuprofen. These medicines can thin your blood. Do not take these medicines before your procedure if your health care provider instructs you not to.  · Ask your health care provider how your surgical site will be marked or identified.  · You may be given antibiotic medicine to help prevent infection or to treat existing inflammation or infection.  · If you will be going home on the same day as your surgery, plan to have someone with you for 24 hours.  What happens during the procedure?  · To reduce your risk of infection:  ¨ Your health care team will wash or sanitize their hands.  ¨ Your skin will be washed with soap.  · An IV tube will be inserted into one of your veins. You will receive medicine and fluids through this tube.  · You will be given one or more of the following:  ¨ A medicine to help you relax (sedative).  ¨ A medicine to numb the area (local anesthetic).  ¨ A medicine to make you fall asleep (general anesthetic).  ¨ A medicine that is injected into your spine to numb the area below and slightly  above the injection site (spinal anesthetic).  ¨ A medicine that is injected into an area of your body to numb everything below the injection site (regional anesthetic).  · A thin, flexible tube (catheter) may be put into your bladder to drain urine.  · A tube may be passed through your nose and into your stomach (NG tube, or nasogastric tube) to drain any stomach contents.  · Your surgeon will make two or three small incisions near your belly button (navel).  · Air-like gas will be used to fill your abdomen. The gas will make your abdomen expand. This helps the surgeon to see clearly and gives him or her more room to work.  · A laparoscope will be passed through one of the incisions.  · Other long, thin surgical instruments will be passed through the other incisions.  · The appendix will be located and removed through one of the incisions.  · The abdomen may be washed out to remove bacteria.  · The incisions will be closed with stitches (sutures), staples, or adhesive strips.  · A bandage (dressing) may be used to cover the incisions.  · If a tube was inserted into your bladder or stomach, it will be removed.  What happens after the procedure?  · Your blood pressure, heart rate, breathing rate, and blood oxygen level will be monitored often until the medicines you were given have worn off.  · You will be given pain medicine as needed to keep you comfortable.  This information is not intended to replace advice given to you by your health care provider. Make sure you discuss any questions you have with your health care provider.  Document Released: 08/01/2005 Document Revised: 05/25/2017 Document Reviewed: 06/06/2016  [employer] (acquired by [employer] in 2014) Interactive Patient Education © 2017 [employer]    HOME CARE  · Do not drive while taking pain medicine (narcotics).  · Take medicine (stool softener) if you cannot poop (constipated).  · Change your bandages (dressings) as told by your doctor.  · Keep your wounds clean and dry. Wash the  wounds gently with soap and water. Gently pat the wounds dry with a clean towel.  · Do not take baths, swim, or use hot tubs for 10 days, or as told by your doctor.  · Only take medicine as told by your doctor.  · Continue your normal diet as told by your doctor.  · Do not lift more than 10 pounds (4.5 kilograms) or play contact sports for 3 weeks, or as told by your doctor.  · Slowly increase your activity.  · Take deep breaths to avoid a lung infection (pneumonia).  GET HELP RIGHT AWAY IF:   · You have a fever.  · You have a rash.  · You have trouble breathing or sharp chest pain.  · You have a reaction to the medicine you are taking.  · Your wound is red, puffy (swollen), or painful.  · You have yellowish-white fluid (pus) coming from the wound.  · You have fluid coming from the wound for longer than 1 day.  · You notice a bad smell coming from the wound or bandage.  · Your wound breaks open after stitches (sutures) or staples are removed.  · You have pain in the shoulders or shoulder blades.  · You feel dizzy or pass out (faint).  · You are short of breath.  · You feel sick to your stomach (nauseous) or throw up (vomit).  · You cannot control when you poop, or you lose your appetite.  · You have watery poop (diarrhea).  MAKE SURE YOU:  · Understand these instructions.  · Will watch your condition.  · Will get help right away if you are not doing well or get worse.     This information is not intended to replace advice given to you by your health care provider. Make sure you discuss any questions you have with your health care provider.     Document Released: 10/14/2010 Document Revised: 01/08/2016 Document Reviewed: 06/26/2012  [employer] Interactive Patient Education ©2016 [employer]    Amoxicillin; Clavulanic Acid tablets  What is this medicine?  AMOXICILLIN; CLAVULANIC ACID (a mox i CRISTAL in; CATRACHITO orr AS id) is a penicillin antibiotic. It is used to treat certain kinds of bacterial infections. It will  not work for colds, flu, or other viral infections.  This medicine may be used for other purposes; ask your health care provider or pharmacist if you have questions.  COMMON BRAND NAME(S): Augmentin  What should I tell my health care provider before I take this medicine?  They need to know if you have any of these conditions:  -bowel disease, like colitis  -kidney disease  -liver disease  -mononucleosis  -an unusual or allergic reaction to amoxicillin, penicillin, cephalosporin, other antibiotics, clavulanic acid, other medicines, foods, dyes, or preservatives  -pregnant or trying to get pregnant  -breast-feeding  How should I use this medicine?  Take this medicine by mouth with a full glass of water. Follow the directions on the prescription label. Take at the start of a meal. Do not crush or chew. If the tablet has a score line, you may cut it in half at the score line for easier swallowing. Take your medicine at regular intervals. Do not take your medicine more often than directed. Take all of your medicine as directed even if you think you are better. Do not skip doses or stop your medicine early.  Talk to your pediatrician regarding the use of this medicine in children. Special care may be needed.  Overdosage: If you think you have taken too much of this medicine contact a poison control center or emergency room at once.  NOTE: This medicine is only for you. Do not share this medicine with others.  What if I miss a dose?  If you miss a dose, take it as soon as you can. If it is almost time for your next dose, take only that dose. Do not take double or extra doses.  What may interact with this medicine?  -allopurinol  -anticoagulants  -birth control pills  -methotrexate  -probenecid  This list may not describe all possible interactions. Give your health care provider a list of all the medicines, herbs, non-prescription drugs, or dietary supplements you use. Also tell them if you smoke, drink alcohol, or use  illegal drugs. Some items may interact with your medicine.  What should I watch for while using this medicine?  Tell your doctor or health care professional if your symptoms do not improve.  Do not treat diarrhea with over the counter products. Contact your doctor if you have diarrhea that lasts more than 2 days or if it is severe and watery.  If you have diabetes, you may get a false-positive result for sugar in your urine. Check with your doctor or health care professional.  Birth control pills may not work properly while you are taking this medicine. Talk to your doctor about using an extra method of birth control.  What side effects may I notice from receiving this medicine?  Side effects that you should report to your doctor or health care professional as soon as possible:  -allergic reactions like skin rash, itching or hives, swelling of the face, lips, or tongue  -breathing problems  -dark urine  -fever or chills, sore throat  -redness, blistering, peeling or loosening of the skin, including inside the mouth  -seizures  -trouble passing urine or change in the amount of urine  -unusual bleeding, bruising  -unusually weak or tired  -white patches or sores in the mouth or throat  Side effects that usually do not require medical attention (report to your doctor or health care professional if they continue or are bothersome):  -diarrhea  -dizziness  -headache  -nausea, vomiting  -stomach upset  -vaginal or anal irritation  This list may not describe all possible side effects. Call your doctor for medical advice about side effects. You may report side effects to FDA at 3-352-FDA-9200.  Where should I keep my medicine?  Keep out of the reach of children.  Store at room temperature below 25 degrees C (77 degrees F). Keep container tightly closed. Throw away any unused medicine after the expiration date.  NOTE: This sheet is a summary. It may not cover all possible information. If you have questions about this  medicine, talk to your doctor, pharmacist, or health care provider.  © 2018 [employer] (2009-03-12 12:04:30)      Depression / Suicide Risk    As you are discharged from this [facility] facility, it is important to learn how to keep safe from harming yourself.    Recognize the warning signs:  · Abrupt changes in personality, positive or negative- including increase in energy   · Giving away possessions  · Change in eating patterns- significant weight changes-  positive or negative  · Change in sleeping patterns- unable to sleep or sleeping all the time   · Unwillingness or inability to communicate  · Depression  · Unusual sadness, discouragement and loneliness  · Talk of wanting to die  · Neglect of personal appearance   · Rebelliousness- reckless behavior  · Withdrawal from people/activities they love  · Confusion- inability to concentrate     If you or a loved one observes any of these behaviors or has concerns about self-harm, here's what you can do:  · Talk about it- your feelings and reasons for harming yourself  · Remove any means that you might use to hurt yourself (examples: pills, rope, extension cords, firearm)  · Get professional help from the community (Mental Health, Substance Abuse, psychological counseling)  · Do not be alone:Call your Safe Contact- someone whom you trust who will be there for you.  · Call your local CRISIS HOTLINE 711-1294 or 555-195-0588  · Call your local Children's Mobile Crisis Response Team [place] (465) 999-2243 or www.[employer]  · Call the toll free National Suicide Prevention Hotlines   · National Suicide Prevention Lifeline 946-080-JGPL (3513)  · National Hope Line Network 800-SUICIDE (043-7525)

## 2020-03-21 ENCOUNTER — APPOINTMENT (OUTPATIENT)
Dept: RADIOLOGY | Facility: MEDICAL CENTER | Age: 40
End: 2020-03-21
Attending: EMERGENCY MEDICINE
Payer: COMMERCIAL

## 2020-03-21 ENCOUNTER — HOSPITAL ENCOUNTER (OUTPATIENT)
Facility: MEDICAL CENTER | Age: 40
End: 2020-03-22
Attending: EMERGENCY MEDICINE | Admitting: SURGERY
Payer: COMMERCIAL

## 2020-03-21 DIAGNOSIS — E87.20 LACTIC ACIDOSIS: ICD-10-CM

## 2020-03-21 DIAGNOSIS — R11.10 VOMITING, INTRACTABILITY OF VOMITING NOT SPECIFIED, PRESENCE OF NAUSEA NOT SPECIFIED, UNSPECIFIED VOMITING TYPE: ICD-10-CM

## 2020-03-21 LAB
ALBUMIN SERPL BCP-MCNC: 3.6 G/DL (ref 3.2–4.9)
ALBUMIN/GLOB SERPL: 1.1 G/DL
ALP SERPL-CCNC: 61 U/L (ref 30–99)
ALT SERPL-CCNC: 22 U/L (ref 2–50)
AMPHET UR QL SCN: NEGATIVE
ANION GAP SERPL CALC-SCNC: 14 MMOL/L (ref 7–16)
APPEARANCE UR: CLEAR
AST SERPL-CCNC: 22 U/L (ref 12–45)
BARBITURATES UR QL SCN: NEGATIVE
BASOPHILS # BLD AUTO: 1.7 % (ref 0–1.8)
BASOPHILS # BLD: 0.23 K/UL (ref 0–0.12)
BENZODIAZ UR QL SCN: NEGATIVE
BILIRUB SERPL-MCNC: 0.2 MG/DL (ref 0.1–1.5)
BILIRUB UR QL STRIP.AUTO: NEGATIVE
BUN SERPL-MCNC: 12 MG/DL (ref 8–22)
BZE UR QL SCN: NEGATIVE
CALCIUM SERPL-MCNC: 9.5 MG/DL (ref 8.5–10.5)
CANNABINOIDS UR QL SCN: POSITIVE
CHLORIDE SERPL-SCNC: 104 MMOL/L (ref 96–112)
CO2 SERPL-SCNC: 22 MMOL/L (ref 20–33)
COLOR UR: YELLOW
CREAT SERPL-MCNC: 0.9 MG/DL (ref 0.5–1.4)
EKG IMPRESSION: NORMAL
EOSINOPHIL # BLD AUTO: 0.71 K/UL (ref 0–0.51)
EOSINOPHIL NFR BLD: 5.2 % (ref 0–6.9)
ERYTHROCYTE [DISTWIDTH] IN BLOOD BY AUTOMATED COUNT: 40.9 FL (ref 35.9–50)
GLOBULIN SER CALC-MCNC: 3.4 G/DL (ref 1.9–3.5)
GLUCOSE SERPL-MCNC: 111 MG/DL (ref 65–99)
GLUCOSE UR STRIP.AUTO-MCNC: NEGATIVE MG/DL
HCT VFR BLD AUTO: 46.2 % (ref 42–52)
HGB BLD-MCNC: 15.6 G/DL (ref 14–18)
KETONES UR STRIP.AUTO-MCNC: NEGATIVE MG/DL
LACTATE BLD-SCNC: 2 MMOL/L (ref 0.5–2)
LACTATE BLD-SCNC: 2.4 MMOL/L (ref 0.5–2)
LACTATE BLD-SCNC: 6.6 MMOL/L (ref 0.5–2)
LEUKOCYTE ESTERASE UR QL STRIP.AUTO: NEGATIVE
LIPASE SERPL-CCNC: 44 U/L (ref 11–82)
LYMPHOCYTES # BLD AUTO: 3.67 K/UL (ref 1–4.8)
LYMPHOCYTES NFR BLD: 27 % (ref 22–41)
MANUAL DIFF BLD: NORMAL
MCH RBC QN AUTO: 29.8 PG (ref 27–33)
MCHC RBC AUTO-ENTMCNC: 33.8 G/DL (ref 33.7–35.3)
MCV RBC AUTO: 88.2 FL (ref 81.4–97.8)
METHADONE UR QL SCN: NEGATIVE
MICRO URNS: NORMAL
MONOCYTES # BLD AUTO: 0.83 K/UL (ref 0–0.85)
MONOCYTES NFR BLD AUTO: 6.1 % (ref 0–13.4)
MORPHOLOGY BLD-IMP: NORMAL
NEUTROPHILS # BLD AUTO: 8.16 K/UL (ref 1.82–7.42)
NEUTROPHILS NFR BLD: 60 % (ref 44–72)
NITRITE UR QL STRIP.AUTO: NEGATIVE
NRBC # BLD AUTO: 0 K/UL
NRBC BLD-RTO: 0 /100 WBC
OPIATES UR QL SCN: POSITIVE
OXYCODONE UR QL SCN: NEGATIVE
PCP UR QL SCN: NEGATIVE
PH UR STRIP.AUTO: 6.5 [PH] (ref 5–8)
PLATELET # BLD AUTO: 434 K/UL (ref 164–446)
PLATELET BLD QL SMEAR: NORMAL
PMV BLD AUTO: 8.9 FL (ref 9–12.9)
POTASSIUM SERPL-SCNC: 4 MMOL/L (ref 3.6–5.5)
PROPOXYPH UR QL SCN: NEGATIVE
PROT SERPL-MCNC: 7 G/DL (ref 6–8.2)
PROT UR QL STRIP: NEGATIVE MG/DL
RBC # BLD AUTO: 5.24 M/UL (ref 4.7–6.1)
RBC BLD AUTO: NORMAL
RBC UR QL AUTO: NEGATIVE
SODIUM SERPL-SCNC: 140 MMOL/L (ref 135–145)
SP GR UR REFRACTOMETRY: 1.05
TROPONIN T SERPL-MCNC: <6 NG/L (ref 6–19)
UROBILINOGEN UR STRIP.AUTO-MCNC: 0.2 MG/DL
WBC # BLD AUTO: 13.6 K/UL (ref 4.8–10.8)

## 2020-03-21 PROCEDURE — 700111 HCHG RX REV CODE 636 W/ 250 OVERRIDE (IP): Performed by: EMERGENCY MEDICINE

## 2020-03-21 PROCEDURE — 85007 BL SMEAR W/DIFF WBC COUNT: CPT

## 2020-03-21 PROCEDURE — 99285 EMERGENCY DEPT VISIT HI MDM: CPT

## 2020-03-21 PROCEDURE — 83690 ASSAY OF LIPASE: CPT

## 2020-03-21 PROCEDURE — G0378 HOSPITAL OBSERVATION PER HR: HCPCS

## 2020-03-21 PROCEDURE — 96374 THER/PROPH/DIAG INJ IV PUSH: CPT

## 2020-03-21 PROCEDURE — 700105 HCHG RX REV CODE 258: Performed by: EMERGENCY MEDICINE

## 2020-03-21 PROCEDURE — 93005 ELECTROCARDIOGRAM TRACING: CPT | Performed by: EMERGENCY MEDICINE

## 2020-03-21 PROCEDURE — 700111 HCHG RX REV CODE 636 W/ 250 OVERRIDE (IP): Performed by: SURGERY

## 2020-03-21 PROCEDURE — 80307 DRUG TEST PRSMV CHEM ANLYZR: CPT

## 2020-03-21 PROCEDURE — 80053 COMPREHEN METABOLIC PANEL: CPT

## 2020-03-21 PROCEDURE — 700102 HCHG RX REV CODE 250 W/ 637 OVERRIDE(OP): Performed by: SURGERY

## 2020-03-21 PROCEDURE — 74022 RADEX COMPL AQT ABD SERIES: CPT

## 2020-03-21 PROCEDURE — 83605 ASSAY OF LACTIC ACID: CPT

## 2020-03-21 PROCEDURE — 71045 X-RAY EXAM CHEST 1 VIEW: CPT

## 2020-03-21 PROCEDURE — 85027 COMPLETE CBC AUTOMATED: CPT

## 2020-03-21 PROCEDURE — 81003 URINALYSIS AUTO W/O SCOPE: CPT

## 2020-03-21 PROCEDURE — 74177 CT ABD & PELVIS W/CONTRAST: CPT

## 2020-03-21 PROCEDURE — 700117 HCHG RX CONTRAST REV CODE 255: Performed by: EMERGENCY MEDICINE

## 2020-03-21 PROCEDURE — 84484 ASSAY OF TROPONIN QUANT: CPT

## 2020-03-21 PROCEDURE — 96375 TX/PRO/DX INJ NEW DRUG ADDON: CPT

## 2020-03-21 PROCEDURE — 96376 TX/PRO/DX INJ SAME DRUG ADON: CPT

## 2020-03-21 PROCEDURE — 700101 HCHG RX REV CODE 250: Performed by: SURGERY

## 2020-03-21 PROCEDURE — A9270 NON-COVERED ITEM OR SERVICE: HCPCS | Performed by: SURGERY

## 2020-03-21 RX ORDER — OXYCODONE HYDROCHLORIDE 5 MG/1
5 TABLET ORAL
Status: DISCONTINUED | OUTPATIENT
Start: 2020-03-21 | End: 2020-03-22 | Stop reason: HOSPADM

## 2020-03-21 RX ORDER — SODIUM CHLORIDE, SODIUM LACTATE, POTASSIUM CHLORIDE, CALCIUM CHLORIDE 600; 310; 30; 20 MG/100ML; MG/100ML; MG/100ML; MG/100ML
1000 INJECTION, SOLUTION INTRAVENOUS ONCE
Status: COMPLETED | OUTPATIENT
Start: 2020-03-21 | End: 2020-03-21

## 2020-03-21 RX ORDER — ONDANSETRON 2 MG/ML
4 INJECTION INTRAMUSCULAR; INTRAVENOUS EVERY 4 HOURS PRN
Status: DISCONTINUED | OUTPATIENT
Start: 2020-03-21 | End: 2020-03-22 | Stop reason: HOSPADM

## 2020-03-21 RX ORDER — LORAZEPAM 2 MG/ML
0.5 INJECTION INTRAMUSCULAR ONCE
Status: COMPLETED | OUTPATIENT
Start: 2020-03-21 | End: 2020-03-21

## 2020-03-21 RX ORDER — MORPHINE SULFATE 4 MG/ML
4 INJECTION, SOLUTION INTRAMUSCULAR; INTRAVENOUS ONCE
Status: COMPLETED | OUTPATIENT
Start: 2020-03-21 | End: 2020-03-21

## 2020-03-21 RX ORDER — CELECOXIB 200 MG/1
200 CAPSULE ORAL 2 TIMES DAILY
Status: DISCONTINUED | OUTPATIENT
Start: 2020-03-21 | End: 2020-03-22 | Stop reason: HOSPADM

## 2020-03-21 RX ORDER — SODIUM CHLORIDE, SODIUM LACTATE, POTASSIUM CHLORIDE, AND CALCIUM CHLORIDE .6; .31; .03; .02 G/100ML; G/100ML; G/100ML; G/100ML
30 INJECTION, SOLUTION INTRAVENOUS ONCE
Status: COMPLETED | OUTPATIENT
Start: 2020-03-21 | End: 2020-03-21

## 2020-03-21 RX ORDER — SODIUM CHLORIDE AND POTASSIUM CHLORIDE 150; 450 MG/100ML; MG/100ML
INJECTION, SOLUTION INTRAVENOUS CONTINUOUS
Status: DISCONTINUED | OUTPATIENT
Start: 2020-03-21 | End: 2020-03-22

## 2020-03-21 RX ORDER — ONDANSETRON 2 MG/ML
4 INJECTION INTRAMUSCULAR; INTRAVENOUS ONCE
Status: COMPLETED | OUTPATIENT
Start: 2020-03-21 | End: 2020-03-21

## 2020-03-21 RX ORDER — ESCITALOPRAM OXALATE 10 MG/1
10 TABLET ORAL DAILY
Status: DISCONTINUED | OUTPATIENT
Start: 2020-03-21 | End: 2020-03-22 | Stop reason: HOSPADM

## 2020-03-21 RX ORDER — OXYCODONE HYDROCHLORIDE 10 MG/1
10 TABLET ORAL
Status: DISCONTINUED | OUTPATIENT
Start: 2020-03-21 | End: 2020-03-22 | Stop reason: HOSPADM

## 2020-03-21 RX ORDER — ACETAMINOPHEN 500 MG
1000 TABLET ORAL EVERY 6 HOURS
Status: DISCONTINUED | OUTPATIENT
Start: 2020-03-21 | End: 2020-03-22 | Stop reason: HOSPADM

## 2020-03-21 RX ADMIN — MORPHINE SULFATE 4 MG: 4 INJECTION INTRAVENOUS at 14:01

## 2020-03-21 RX ADMIN — ONDANSETRON 4 MG: 2 SOLUTION INTRAMUSCULAR; INTRAVENOUS at 17:27

## 2020-03-21 RX ADMIN — OXYCODONE HYDROCHLORIDE 10 MG: 10 TABLET ORAL at 22:31

## 2020-03-21 RX ADMIN — ONDANSETRON 4 MG: 2 SOLUTION INTRAMUSCULAR; INTRAVENOUS at 14:02

## 2020-03-21 RX ADMIN — ACETAMINOPHEN 1000 MG: 500 TABLET ORAL at 20:49

## 2020-03-21 RX ADMIN — LORAZEPAM 0.5 MG: 2 INJECTION INTRAMUSCULAR; INTRAVENOUS at 12:00

## 2020-03-21 RX ADMIN — SODIUM CHLORIDE, POTASSIUM CHLORIDE, SODIUM LACTATE AND CALCIUM CHLORIDE 1000 ML: 600; 310; 30; 20 INJECTION, SOLUTION INTRAVENOUS at 15:30

## 2020-03-21 RX ADMIN — CELECOXIB 200 MG: 200 CAPSULE ORAL at 20:49

## 2020-03-21 RX ADMIN — OXYCODONE HYDROCHLORIDE 10 MG: 10 TABLET ORAL at 17:27

## 2020-03-21 RX ADMIN — ONDANSETRON 4 MG: 2 SOLUTION INTRAMUSCULAR; INTRAVENOUS at 11:41

## 2020-03-21 RX ADMIN — ONDANSETRON 4 MG: 2 SOLUTION INTRAMUSCULAR; INTRAVENOUS at 22:31

## 2020-03-21 RX ADMIN — POTASSIUM CHLORIDE AND SODIUM CHLORIDE: 450; 150 INJECTION, SOLUTION INTRAVENOUS at 21:12

## 2020-03-21 RX ADMIN — IOHEXOL 25 ML: 240 INJECTION, SOLUTION INTRATHECAL; INTRAVASCULAR; INTRAVENOUS; ORAL at 16:11

## 2020-03-21 RX ADMIN — IOHEXOL 100 ML: 350 INJECTION, SOLUTION INTRAVENOUS at 15:35

## 2020-03-21 RX ADMIN — SODIUM CHLORIDE, POTASSIUM CHLORIDE, SODIUM LACTATE AND CALCIUM CHLORIDE 2940 ML: 600; 310; 30; 20 INJECTION, SOLUTION INTRAVENOUS at 16:30

## 2020-03-21 ASSESSMENT — COGNITIVE AND FUNCTIONAL STATUS - GENERAL
SUGGESTED CMS G CODE MODIFIER DAILY ACTIVITY: CH
DAILY ACTIVITIY SCORE: 24
MOBILITY SCORE: 24
SUGGESTED CMS G CODE MODIFIER MOBILITY: CH

## 2020-03-21 ASSESSMENT — LIFESTYLE VARIABLES
TOTAL SCORE: 0
EVER_SMOKED: NEVER
HAVE YOU EVER FELT YOU SHOULD CUT DOWN ON YOUR DRINKING: NO
DO YOU DRINK ALCOHOL: NO
AVERAGE NUMBER OF DAYS PER WEEK YOU HAVE A DRINK CONTAINING ALCOHOL: 0
TOTAL SCORE: 0
CONSUMPTION TOTAL: NEGATIVE
CONSUMPTION TOTAL: INCOMPLETE
HAVE PEOPLE ANNOYED YOU BY CRITICIZING YOUR DRINKING: NO
ON A TYPICAL DAY WHEN YOU DRINK ALCOHOL HOW MANY DRINKS DO YOU HAVE: 0
TOTAL SCORE: 0
HAVE PEOPLE ANNOYED YOU BY CRITICIZING YOUR DRINKING: NO
HAVE YOU EVER FELT YOU SHOULD CUT DOWN ON YOUR DRINKING: NO
EVER HAD A DRINK FIRST THING IN THE MORNING TO STEADY YOUR NERVES TO GET RID OF A HANGOVER: NO
TOTAL SCORE: 0
TOTAL SCORE: 0
ALCOHOL_USE: NO
DOES PATIENT WANT TO STOP DRINKING: NO
EVER FELT BAD OR GUILTY ABOUT YOUR DRINKING: NO
EVER HAD A DRINK FIRST THING IN THE MORNING TO STEADY YOUR NERVES TO GET RID OF A HANGOVER: NO
TOTAL SCORE: 0
HOW MANY TIMES IN THE PAST YEAR HAVE YOU HAD 5 OR MORE DRINKS IN A DAY: 0
EVER FELT BAD OR GUILTY ABOUT YOUR DRINKING: NO

## 2020-03-21 ASSESSMENT — PATIENT HEALTH QUESTIONNAIRE - PHQ9
SUM OF ALL RESPONSES TO PHQ9 QUESTIONS 1 AND 2: 0
2. FEELING DOWN, DEPRESSED, IRRITABLE, OR HOPELESS: NOT AT ALL
1. LITTLE INTEREST OR PLEASURE IN DOING THINGS: NOT AT ALL

## 2020-03-21 ASSESSMENT — FIBROSIS 4 INDEX: FIB4 SCORE: 0.6

## 2020-03-21 NOTE — ED PROVIDER NOTES
ED Provider Note    Scribed for Steph Garcia M.D. by Michael Brown. 3/21/2020  11:47 AM    Primary care provider: None reported  Means of arrival: Wheel chair  History obtained from: patient  History limited by: none  CHIEF COMPLAINT  Chief Complaint   Patient presents with   • Post-Op Complications     reports recent appendectomy. c/o starting to emesis this AM uncontrollably. diaphoretic. pt worried about stitches coming out. unable to get temp in triage        HPI  Alek Davila is a 39 y.o. male who presents to the ED for right sided abdominal pain status post appendectomy. He is currently experiencing symptoms of emesis and hot/cold flashes. Per the patient, he had an emergency appendectomy performed by Dr. Orellana on March 15 and was discharged 2 days ago. Patient was not discharged on any anti-nausea or pain medications. He has been having abdominal soreness, and gas pains since the surgery, but was otherwise doing well. He has been tolerating soups and is able to keep it all down; however, he woke up this morning and developed vomiting and hot/cold clashes so he came into the ED for evaluation.  He notes these symptoms started a couple of hours ago.  His abdominal soreness and gas pains are still persistent and have not changed in severity since his discharge. His last normal bowel movement was earlier this morning. Patient believes he may have potentially ripped some of his sutures secondary to his emesis episodes.  He denies any sore throat, runny nose, or flu like symptoms. No pain or swelling in the lower extremities. No chest pains, coughs or shortness of breath. No hematemesis, diarrhea or fevers. He also denies any recent contact to individuals who tested positive for COVID-19.  No travel outside of the local area over the last 2 months.  Not on any regular medications. No narcotic drug use, alcohol use, cigarettes use, or any medical problems.    REVIEW OF SYSTEMS  See HPI for further details.   Pertinent  positives include: emesis, hot/cold flashes. Persistent abdominal soreness and gas pain  Pertinent negatives include: He denies any sore throat, runny nose, or flu like symptoms. No pain or swelling in the lower extremities. No chest pains, coughs or shortness of breath. No hematemesis, diarrhea or fevers.  All other systems are negative.    PAST MEDICAL HISTORY  Past Medical History:   Diagnosis Date   • Anxiety    • Depression    • Pancreatitis        FAMILY HISTORY  None reported    SOCIAL HISTORY  Social History     Tobacco Use   • Smoking status: Never Smoker   • Smokeless tobacco: Never Used   Substance Use Topics   • Alcohol use: Not Currently     Frequency: 2-3 times a week     Comment: social   • Drug use: Never      Social History     Substance and Sexual Activity   Drug Use Never       SURGICAL HISTORY  Past Surgical History:   Procedure Laterality Date   • PB LAP,APPENDECTOMY N/A 3/15/2020    Procedure: APPENDECTOMY, LAPAROSCOPIC;  Surgeon: Leonardo Orellana M.D.;  Location: SURGERY [facility];  Service: General       CURRENT MEDICATIONS    Current Facility-Administered Medications:   •  ondansetron (ZOFRAN) syringe/vial injection 4 mg, 4 mg, Intravenous, Q4HRS PRN, Gildardo Phillips M.D.  •  oxyCODONE immediate-release (ROXICODONE) tablet 5 mg, 5 mg, Oral, Q3HRS PRN, Gildardo Phillips M.D.  •  oxyCODONE immediate-release (ROXICODONE) tablet 10 mg, 10 mg, Oral, Q3HRS PRN, Gildardo Phillips M.D.  •  escitalopram (LEXAPRO) tablet 10 mg, 10 mg, Oral, DAILY, Gildardo Phillips M.D.    Current Outpatient Medications:   •  amoxicillin-clavulanate (AUGMENTIN) 875-125 MG Tab, Take 1 Tab by mouth 2 times a day., Disp: 8 Tab, Rfl: 0  •  escitalopram (LEXAPRO) 10 MG Tab, Take 10 mg by mouth every day., Disp: , Rfl:   Of note, patient did not fill the Augmentin he was discharged with on March 19    ALLERGIES  No Known Allergies    PHYSICAL EXAM  VITAL SIGNS: /109   Pulse 80   Resp 17   Ht 1.854  "m (6' 1\")   Wt 98 kg (216 lb)   SpO2 98%   BMI 28.50 kg/m²      Constitutional: Well developed, well nourished; Anxious appearing and hyperventilating.  Moderate distress due to pain, nausea and vomiting  HENT: Normocephalic, atraumatic; Bilateral external ears normal; Oropharynx with dry mucous membranes; No erythema or exudates in the posterior oropharynx.   Eyes: PERRL, EOMI, Conjunctiva normal. No discharge.   Neck:  Supple, nontender midline; No stridor; No nuchal rigidity.   Lymphatic: No cervical lymphadenopathy noted.   Cardiovascular: Regular rate and rhythm without murmurs, rubs, or gallop.   Thorax & Lungs: No respiratory distress, breath sounds clear to auscultation bilaterally without wheezing, rales or rhonchi. Nontender chest wall. No crepitus or subcutaneous air  Abdomen: Soft with tenderness to percussion and palpation of the right abdomen. Bowel sounds are significantly decreased. No obvious masses; No pulsatile masses; no rebound, guarding, or peritoneal signs.   Skin: Good color; warm and dry without rash or petechia. He has a laparoscopic surgical wound which is dressed and appears clean, dry, and intact with exception of some dry old blood to the wound located in the right upper abdomen.   Back: Nontender, No CVA tenderness.   Extremities: Distal dorsalis pedis, posterior tibial pulses are equal bilaterally; No edema; Nontender calves or saphenous, No cyanosis, No clubbing.   Musculoskeletal: Good range of motion in all major joints. No tenderness to palpation or major deformities noted.   Neurologic: Alert & oriented x 4, clear speech    EKG  12 Lead EKG interpreted by me as shown below.       LABS/RADIOLOGY/PROCEDURES  Results for orders placed or performed during the hospital encounter of 03/21/20   LACTIC ACID   Result Value Ref Range    Lactic Acid 2.4 (H) 0.5 - 2.0 mmol/L   CBC WITH DIFFERENTIAL   Result Value Ref Range    WBC 13.6 (H) 4.8 - 10.8 K/uL    RBC 5.24 4.70 - 6.10 M/uL    " Hemoglobin 15.6 14.0 - 18.0 g/dL    Hematocrit 46.2 42.0 - 52.0 %    MCV 88.2 81.4 - 97.8 fL    MCH 29.8 27.0 - 33.0 pg    MCHC 33.8 33.7 - 35.3 g/dL    RDW 40.9 35.9 - 50.0 fL    Platelet Count 434 164 - 446 K/uL    MPV 8.9 (L) 9.0 - 12.9 fL    Neutrophils-Polys 60.00 44.00 - 72.00 %    Lymphocytes 27.00 22.00 - 41.00 %    Monocytes 6.10 0.00 - 13.40 %    Eosinophils 5.20 0.00 - 6.90 %    Basophils 1.70 0.00 - 1.80 %    Nucleated RBC 0.00 /100 WBC    Neutrophils (Absolute) 8.16 (H) 1.82 - 7.42 K/uL    Lymphs (Absolute) 3.67 1.00 - 4.80 K/uL    Monos (Absolute) 0.83 0.00 - 0.85 K/uL    Eos (Absolute) 0.71 (H) 0.00 - 0.51 K/uL    Baso (Absolute) 0.23 (H) 0.00 - 0.12 K/uL    NRBC (Absolute) 0.00 K/uL   LIPASE   Result Value Ref Range    Lipase 44 11 - 82 U/L   Comp Metabolic Panel   Result Value Ref Range    Sodium 140 135 - 145 mmol/L    Potassium 4.0 3.6 - 5.5 mmol/L    Chloride 104 96 - 112 mmol/L    Co2 22 20 - 33 mmol/L    Anion Gap 14.0 7.0 - 16.0    Glucose 111 (H) 65 - 99 mg/dL    Bun 12 8 - 22 mg/dL    Creatinine 0.90 0.50 - 1.40 mg/dL    Calcium 9.5 8.5 - 10.5 mg/dL    AST(SGOT) 22 12 - 45 U/L    ALT(SGPT) 22 2 - 50 U/L    Alkaline Phosphatase 61 30 - 99 U/L    Total Bilirubin 0.2 0.1 - 1.5 mg/dL    Albumin 3.6 3.2 - 4.9 g/dL    Total Protein 7.0 6.0 - 8.2 g/dL    Globulin 3.4 1.9 - 3.5 g/dL    A-G Ratio 1.1 g/dL   DIFFERENTIAL MANUAL   Result Value Ref Range    Manual Diff Status PERFORMED    PERIPHERAL SMEAR REVIEW   Result Value Ref Range    Peripheral Smear Review see below    PLATELET ESTIMATE   Result Value Ref Range    Plt Estimation Increased    MORPHOLOGY   Result Value Ref Range    RBC Morphology Normal    ESTIMATED GFR   Result Value Ref Range    GFR If African American >60 >60 mL/min/1.73 m 2    GFR If Non African American >60 >60 mL/min/1.73 m 2   TROPONIN   Result Value Ref Range    Troponin T <6 6 - 19 ng/L   LACTIC ACID   Result Value Ref Range    Lactic Acid 6.6 (HH) 0.5 - 2.0 mmol/L   EKG  (NOW)   Result Value Ref Range    Report       [facility] Emergency Dept.    Test Date:  2020  Pt Name:    DARI JUAREZ                  Department: ER  MRN:        [id number]                      Room:        64  Gender:     Male                         Technician: [id number]  :        1980                   Requested By:STEPH GARCIA  Order #:    [id number]                    Reading MD: Steph Garcia    Measurements  Intervals                                Axis  Rate:       54                           P:          17  MN:         148                          QRS:        80  QRSD:       100                          T:          63  QT:         460  QTc:        436    Interpretive Statements  SINUS BRADYCARDIA rate 54  Normal axis  Normal intervals  T waves flat AVL  ST ELEV, PROBABLE NORMAL EARLY REPOL PATTERN  No previous ECG available for comparison  Electronically Signed On 3- 16:28:52 PDT by Steph Garcia             DX-CHEST-PORTABLE (1 VIEW)   Final Result      No evidence of acute cardiopulmonary process.      CT-ABDOMEN-PELVIS WITH   Final Result      1.  Surgical change consistent with recent appendectomy. No evidence of abscess.      2.  No evidence of bowel obstruction.      3.  Fatty liver.      DX-ABDOMEN COMPLETE WITH AP OR PA CXR   Final Result      Borderline dilated loops of small intestine within the right upper quadrant likely representing ileus.          COURSE & MEDICAL DECISION MAKING  Pertinent Labs & Imaging studies reviewed. (See chart for details)    Reviewed patient's old medical records which showed the patient had an appendectomy with Dr. Leonardo Orellana on March 15.  It was focally perforated.  Patient was hospitalized for pain control, IV fluids and antibiotics up until  when he was discharged home.  JEREMIAS drain was removed before discharge.  Patient was sent home on Augmentin.    11:47 AM - Patient seen and examined at bedside. Discussed plan of care. I  "informed the patient the need for labs and radiology to rule out any emergent processes. Currently awaiting labs and radiology results before deciding if intervention is necessary. Patient verbalizes understanding and agreement to this plan of care.     1:57 PM-  Patient was re-evaluated at bedside. He still feels nauseated and complaining of pain. X-ray showed what looks like a developing ileus in right upper quadrant    1:56 PM Spoke with Dr. Phillips, On-call surgery for Dr. Orellana, about the patient's condition. Dr. Phillips would like a CT scan with oral and IV contrast and to call him when the results have come back.     3:10 PM-Dr. Phillips is currently in the ED evaluating the patient at bedside.     3:45 PM Paged Dr. Phillips as CT abdomen/pelvis results have returned.     3:52 PM-Patient was reevaluated at bedside. Discussed lab and radiology results with the patient. He feels slightly better but is still queasy. His pain has improved. He admits that he did not fill his antibiotics. He doesn't think he did it overeating yesterday. No URI symptoms, no headache, no urinary symptoms. Once again, he says the abdominal pain and gas feeling that he has today is the same as he was experiencing in the hospital. He states he did not fill his antibiotics yesterday because he was \"trying to get better and walk around.\"    HYDRATION: Based on the patient's presentation of Acute Vomiting the patient was given IV fluids. IV Hydration was used because oral hydration was not adequate alone. Upon recheck following hydration, the patient was improved but will need further IV fluid administration as his lactic acid level is climbing.     1600: I spoke with Dr. Phillips again who will admit pt to hospital.  He does not want any antibiotics.  He says he will give patient IV fluids, feed him, and see how he does.  He will write all further orders.      Patient presents to the ER complaining of several hours of " vomiting.  He just was released from the hospital 2 days ago after an appendectomy.  He had a surgery on March 15.  He was found to have a locally perforated appendix.  He was discharged on March 19 on Augmentin.  He did not fill his prescription.  He said yesterday he seemed to be doing okay.  He was eating.  He was walking around.  This morning just prior to arrival he started vomiting.  He was very concerned that he might of ripped out his stitches.  The surgical wounds look to be intact.  There is a little bit of dried blood on the right upper quadrant laparoscopic wound.  He has some diffuse tenderness to palpation and percussion of the right abdomen but says he is had similar abdominal pain and tenderness since he was here in the hospital.  He denies fever.  No headache.  No upper respiratory symptoms.  No recent travel in the last 2 months.  He denies exposure to anybody who was tested positive for coronavirus.  No urinary symptoms.  No diarrhea.  He said he had a normal bowel movement this morning and he is passing gas.  Patient was fairly comfortable upon arrival.  He was hyperventilating.  He was vomiting.  He was very anxious.  Given nausea medicine and Ativan.  That seemed to help a little bit.  He was, however, still nauseated.  And then given some pain medication.  Lab work reveals a mildly elevated white blood cell count of 13,000.  It was 10,000 when he left the hospital a few days ago.  Lactic acid level is minimally elevated at 2.4.  Since he had very diminished bowel sounds and vomiting I was concerned about the possibility of ileus/small bowel obstruction.  I immediately obtained a x-ray of his abdomen.  X-ray of the abdomen revealed some dilated loops of bowel in the right upper quadrant which may represent ileus.  At this point I contacted the on-call physician for Dr. Leonardo Orellana.  I spoke with Dr. Phillips.  He asked that I do a CT scan with IV and oral contrast since patient is now 5 to 6  days postop and is at risk for complications.  I ordered the CT scan.  Dr. Phillips came down to the ER to evaluate the patient.  We started some lactated Ringer's for his minimally elevated lactic acid level and discussed repeating the lactic acid level soon as that liter of LR had infused.  Patient still had not gone to CT scan by the time the surgeon came down to evaluate the patient.  Plan was to contact him again once CT results were back.  Soon as the CT results came back, I contacted Dr. Phillips again.  CT scan is unremarkable.  No evidence for bowel obstruction.  There are postoperative changes but otherwise nothing acute.  Repeat lactic acid level came back quite a bit higher at 6.6.  I then ordered additional lactated Ringer's for a total of 30 mg/kg.  I asked the nurse to please obtain urine sample, but at the time of this dictation we are still awaiting urine specimen.  At this time urinalysis and drug screen are still pending.  With respect to patient's vomiting, I do not have a good explanation for it at this time.  He says he does not think he is withdrawing from anything.  He says he does not use narcotics on a regular basis.  He denies alcohol use on a regular basis.  I did an EKG and a troponin just to be sure we were not dealing with something cardiac.  EKG is nonacute.  Troponin is undetectable.  At this time and will think this is a cardiac issue.  The patient has no upper respiratory symptoms.  No complaints of headache.  At this time no concern for pneumonia or meningitis.  No rash.  Patient's vital signs remain normal and stable.  He is afebrile.  His white blood cell count and lactic are elevated, but he does not appear to be particularly septic or toxic.  However this is on the differential.  I asked to Dr. Phillips if he wanted me to give the patient some IV antibiotics.  He says he does not want patient to have any IV antibiotics.  At this time further evaluation and management  will be done by the general surgeon.      Disposition: Patient will be hospitalized under the care of Dr. Phillips    FINAL IMPRESSION  1. Vomiting, intractability of vomiting not specified, presence of nausea not specified, unspecified vomiting type Acute   2. Lactic acidosis Acute        This dictation has been created using voice recognition software. The accuracy of the dictation is limited by the abilities of the software. I expect there may be some errors of grammar and possibly content. I made every attempt to manually correct the errors within my dictation. However, errors related to voice recognition software may still exist and should be interpreted within the appropriate context.     Michael POSADAS (Scribe), am scribing for, and in the presence of, Steph Garcia M.D..    Electronically signed by: Michael Brown (Scribhoward), 3/21/2020    Steph POSADAS M.D. personally performed the services described in this documentation, as scribed by Michael Brown in my presence, and it is both accurate and complete. C.    The note accurately reflects work and decisions made by me.  Steph Garcia M.D.  3/21/2020  5:02 PM

## 2020-03-21 NOTE — ED TRIAGE NOTES
..  Chief Complaint   Patient presents with   • Post-Op Complications     reports recent appendectomy. c/o starting to emesis this AM uncontrollably. diaphoretic. pt worried about stitches coming out. unable to get temp in triage    ..  Vitals:    03/21/20 1123   BP: 160/109   Pulse: 80   Resp: 17   .pt roomed

## 2020-03-21 NOTE — ED NOTES
Med Rec Updated and Complete per Pt at bedside  Allergies Reviewed  No PO ABX last 14 days.    Pt denies OTC medication at this time.

## 2020-03-22 VITALS
OXYGEN SATURATION: 94 % | TEMPERATURE: 98.1 F | WEIGHT: 216 LBS | DIASTOLIC BLOOD PRESSURE: 97 MMHG | BODY MASS INDEX: 28.63 KG/M2 | HEART RATE: 73 BPM | HEIGHT: 73 IN | SYSTOLIC BLOOD PRESSURE: 133 MMHG | RESPIRATION RATE: 16 BRPM

## 2020-03-22 LAB
ANION GAP SERPL CALC-SCNC: 9 MMOL/L (ref 7–16)
BASOPHILS # BLD AUTO: 0.5 % (ref 0–1.8)
BASOPHILS # BLD: 0.05 K/UL (ref 0–0.12)
BUN SERPL-MCNC: 7 MG/DL (ref 8–22)
CALCIUM SERPL-MCNC: 8.4 MG/DL (ref 8.5–10.5)
CHLORIDE SERPL-SCNC: 102 MMOL/L (ref 96–112)
CO2 SERPL-SCNC: 25 MMOL/L (ref 20–33)
CREAT SERPL-MCNC: 0.73 MG/DL (ref 0.5–1.4)
EOSINOPHIL # BLD AUTO: 0.36 K/UL (ref 0–0.51)
EOSINOPHIL NFR BLD: 3.9 % (ref 0–6.9)
ERYTHROCYTE [DISTWIDTH] IN BLOOD BY AUTOMATED COUNT: 40.8 FL (ref 35.9–50)
GLUCOSE SERPL-MCNC: 91 MG/DL (ref 65–99)
HCT VFR BLD AUTO: 41.2 % (ref 42–52)
HGB BLD-MCNC: 13.4 G/DL (ref 14–18)
IMM GRANULOCYTES # BLD AUTO: 0.21 K/UL (ref 0–0.11)
IMM GRANULOCYTES NFR BLD AUTO: 2.3 % (ref 0–0.9)
LACTATE BLD-SCNC: 1 MMOL/L (ref 0.5–2)
LACTATE BLD-SCNC: 1.3 MMOL/L (ref 0.5–2)
LYMPHOCYTES # BLD AUTO: 3.31 K/UL (ref 1–4.8)
LYMPHOCYTES NFR BLD: 36 % (ref 22–41)
MCH RBC QN AUTO: 28.8 PG (ref 27–33)
MCHC RBC AUTO-ENTMCNC: 32.5 G/DL (ref 33.7–35.3)
MCV RBC AUTO: 88.4 FL (ref 81.4–97.8)
MONOCYTES # BLD AUTO: 0.8 K/UL (ref 0–0.85)
MONOCYTES NFR BLD AUTO: 8.7 % (ref 0–13.4)
NEUTROPHILS # BLD AUTO: 4.46 K/UL (ref 1.82–7.42)
NEUTROPHILS NFR BLD: 48.6 % (ref 44–72)
NRBC # BLD AUTO: 0 K/UL
NRBC BLD-RTO: 0 /100 WBC
PLATELET # BLD AUTO: 356 K/UL (ref 164–446)
PMV BLD AUTO: 8.9 FL (ref 9–12.9)
POTASSIUM SERPL-SCNC: 3.8 MMOL/L (ref 3.6–5.5)
RBC # BLD AUTO: 4.66 M/UL (ref 4.7–6.1)
SODIUM SERPL-SCNC: 136 MMOL/L (ref 135–145)
WBC # BLD AUTO: 9.2 K/UL (ref 4.8–10.8)

## 2020-03-22 PROCEDURE — 83605 ASSAY OF LACTIC ACID: CPT

## 2020-03-22 PROCEDURE — 700111 HCHG RX REV CODE 636 W/ 250 OVERRIDE (IP): Performed by: SURGERY

## 2020-03-22 PROCEDURE — G0378 HOSPITAL OBSERVATION PER HR: HCPCS

## 2020-03-22 PROCEDURE — 36415 COLL VENOUS BLD VENIPUNCTURE: CPT

## 2020-03-22 PROCEDURE — 85025 COMPLETE CBC W/AUTO DIFF WBC: CPT

## 2020-03-22 PROCEDURE — 80048 BASIC METABOLIC PNL TOTAL CA: CPT

## 2020-03-22 PROCEDURE — A9270 NON-COVERED ITEM OR SERVICE: HCPCS | Performed by: SURGERY

## 2020-03-22 PROCEDURE — 700102 HCHG RX REV CODE 250 W/ 637 OVERRIDE(OP): Performed by: SURGERY

## 2020-03-22 RX ADMIN — ACETAMINOPHEN 1000 MG: 500 TABLET ORAL at 11:14

## 2020-03-22 RX ADMIN — ENOXAPARIN SODIUM 40 MG: 100 INJECTION SUBCUTANEOUS at 09:40

## 2020-03-22 RX ADMIN — CELECOXIB 200 MG: 200 CAPSULE ORAL at 05:47

## 2020-03-22 RX ADMIN — ONDANSETRON 4 MG: 2 SOLUTION INTRAMUSCULAR; INTRAVENOUS at 11:14

## 2020-03-22 RX ADMIN — ACETAMINOPHEN 1000 MG: 500 TABLET ORAL at 05:47

## 2020-03-22 ASSESSMENT — ENCOUNTER SYMPTOMS
NAUSEA: 0
ABDOMINAL PAIN: 0
CHILLS: 0
VOMITING: 0
FEVER: 0

## 2020-03-22 NOTE — H&P
DATE OF ADMISSION:  03/21/2020.    CHIEF COMPLAINT:  Abdominal pain, nausea and vomiting.    HISTORY OF PRESENT ILLNESS:  The patient is a 39-year-old male with history of   depression, anxiety and possible pancreatitis in the past who underwent a   laparoscopic appendectomy on 03/15/2020 performed by Dr. Orellana.  He was noted   to have a perforated appendix, was kept on antibiotics and discharged home on   03/19/2020 by Dr. Coyle.  He was given a prescription for Augmentin, but did   not fill this.  He did well at home until this morning when he began feeling   nauseated, he threw up multiple times and then felt a tearing sensation on his   right side and felt severe pain.  He presented to the ER where he was noted   to have an elevated lactic acid of 2.4.  He was given no IV fluids for 4 hours   until I arrived to see him.  On arrival, he states he continued to have   abdominal pain.  I asked that he be given IV fluids and that a lactate be   rechecked.  He also underwent a CT scan, which shows a fatty change in the   liver and surgical changes consistent with recent appendectomy.  There is no   evidence of abscess or fluid collection in the area of the surgical site.    There was noted to be some minimal stranding within the mesenteric fat.  There   was no evidence of bowel obstruction and no evidence of free air or free   fluid.  A recheck of his lactic acid after being in the ER for at least 4   hours showed increase in his lactic acid to 6.6.  His exam remained benign.    PAST MEDICAL HISTORY:  1.  Depression.  2.  Anxiety.  3.  Possible recurrent pancreatitis.    HOME MEDICATIONS:  Lexapro 10 mg p.o. daily.  He has also been prescribed   Augmentin, which he did not fill.    ALLERGIES:  No known drug allergies.    SOCIAL HISTORY:  He denies tobacco or drug use and states initially that he   does not drink alcohol, but then later admits that he is a rare alcohol user.    FAMILY HISTORY:  Reviewed and  noncontributory.    REVIEW OF SYSTEMS:  A 16-point review of systems is negative except as noted   in the HPI.    PHYSICAL EXAMINATION:  VITAL SIGNS:  Weight 98 kilograms, BMI 28.5, temperature 37.1, heart rate 66,   blood pressure 143/86, O2 saturation 95% on room air.  GENERAL:  He is an age appropriate looking male, in no acute distress.  HEENT:  Pupils equal, round, reactive to light.  No scleral icterus.    Extraocular movements intact.  He has dry oral mucosa and adequate upper and   lower dentition.  NECK:  Supple, no JVD, no lymphadenopathy, no thyromegaly.  LUNGS:  Clear to auscultation bilaterally with a normal bilateral chest rise.  HEART:  Has a regular rate and rhythm.  No murmurs, gallops or rubs.  No   carotid bruits are appreciated.  ABDOMEN:  Nondistended, has positive bowel sounds, is soft and is minimally   tender at his incisions.  He has no peritonitis and no right lower quadrant   tenderness.  RECTAL:  Deferred.  PELVIC:  Deferred.  SKIN:  Warm and dry.  LYMPHATICS:  He has no lymphadenopathy in either his inguinal or his cervical   regions.  EXTREMITIES:  2+ pulses in all 4 extremities.  No clubbing, cyanosis or edema.  NEUROLOGIC:  Cranial nerves II-XII are grossly intact.  He has no focal   deficits and his gait is not examined.    LABORATORY DATA:  CBC at 11:36 today is remarkable for an elevated white blood   cell count at 13.6.  He has got a neutrophil percentage of 60%.  A   comprehensive metabolic panel is remarkable only for a glucose, which is   elevated at 111.  His lactate at 11:36 was 2.4 and is at 1600 after receiving   no IV fluids is 6.6.  His troponin T is 6.    IMAGING:  CT scan is as noted in the HPI.    ASSESSMENT:  1.  A 39-year-old male status post a laparoscopic appendectomy, now with an   elevated lactic acid after throwing up multiple times this morning and   receiving no intravenous fluid replacement in the emergency room for at least   4 hours.  2.  Depression.  3.   Anxiety.  4.  Possible history of pancreatitis.    Given his increased lactic acid, I have recommended that this patient be   admitted to my service and kept overnight for serious lactic acid draws, IV   fluids and labs in the morning.  His exam was benign and I do not believe he   needs to return to the OR at this time.    PLAN:  He will be admitted to my service, given IV fluids.  He has received at   least a 1 liter bolus here in the ER after I arrived and is scheduled for 2   more liters.  I will admit him overnight given a regular diet and see him in   the morning with the plan that if he is improved to send him home tomorrow.  I   have discussed this plan with both the ER doctor and the patient who states   their understanding and agrees.       ____________________________________     MD KRISTIE Ruiz / SUNSHINE    DD:  03/21/2020 16:41:46  DT:  03/21/2020 18:22:10    D#:  [id number]  Job#:  [id number]

## 2020-03-22 NOTE — DISCHARGE INSTRUCTIONS
Laparoscopic Appendectomy, Adult, Care After  Refer to this sheet in the next few weeks. These instructions provide you with information on caring for yourself after your procedure. Your caregiver may also give you more specific instructions. Your treatment has been planned according to current medical practices, but problems sometimes occur. Call your caregiver if you have any problems or questions after your procedure.  HOME CARE INSTRUCTIONS  · Do not drive while taking narcotic pain medicines.  · Use stool softener if you become constipated from your pain medicines.  · Change your bandages (dressings) as directed.  · Keep your wounds clean and dry. You may wash the wounds gently with soap and water. Gently pat the wounds dry with a clean towel.  · Do not take baths, swim, or use hot tubs for 10 days, or as instructed by your caregiver.  · Only take over-the-counter or prescription medicines for pain, discomfort, or fever as directed by your caregiver.  · You may continue your normal diet as directed.  · Do not lift more than 10 pounds (4.5 kg) or play contact sports for 3 weeks, or as directed.  · Slowly increase your activity after surgery.  · Take deep breaths to avoid getting a lung infection (pneumonia).  SEEK MEDICAL CARE IF:  · You have redness, swelling, or increasing pain in your wounds.  · You have pus coming from your wounds.  · You have drainage from a wound that lasts longer than 1 day.  · You notice a bad smell coming from the wounds or dressing.  · Your wound edges break open after stitches (sutures) have been removed.  · You notice increasing pain in the shoulders (shoulder strap areas) or near your shoulder blades.  · You develop dizzy episodes or fainting while standing.  · You develop shortness of breath.  · You develop persistent nausea or vomiting.  · You cannot control your bowel functions or lose your appetite.  · You develop diarrhea.  SEEK IMMEDIATE MEDICAL CARE IF:   · You have a  fever.  · You develop a rash.  · You have difficulty breathing or sharp pains in your chest.  · You develop any reaction or side effects to medicines given.  MAKE SURE YOU:  · Understand these instructions.  · Will watch your condition.  · Will get help right away if you are not doing well or get worse.     This information is not intended to replace advice given to you by your health care provider. Make sure you discuss any questions you have with your health care provider.     Document Released: 12/18/2006 Document Revised: 05/03/2016 Document Reviewed: 06/26/2012  [employer] Interactive Patient Education ©2016 [employer]    PATIENT INSTRUCTIONS  APPENDICITIS    FOLLOW-UP:  Please make an appointment with your physician in 10 day(s).  Call your physician immediately if you have any fevers greater than 102.5, drainage from your wound that is not clear or looks infected, persistent bleeding, increasing abdominal pain, problems urinating, or persistent nausea/vomiting.      WOUND CARE INSTRUCTIONS:  Keep a dry clean dressing on the wound if there is drainage. The initial bandage may be removed after 24 hours.  Once the wound has quit draining you may leave it open to air.  If clothing rubs against the wound or causes irritation and the wound is not draining you may cover it with a dry dressing during the daytime.  Try to keep the wound dry and avoid ointments on the wound unless directed to do so.  If the wound becomes bright red and painful or starts to drain infected material that is not clear, please contact your physician immediately. You should also call immediately if you start to drain large amounts of fluid from the wound, requiring you to change the bandage frequently.   If the wound though is mildly pink and has a thick firm ridge underneath it, this is normal, and is referred to as a healing ridge.  This will resolve over the next 4-6 weeks.    DIET:  You may eat any foods that you can tolerate.  It is a  good idea to eat a high fiber diet and take in plenty of fluids to prevent constipation.  If you do become constipated you may want to take a mild laxative or take ducolax tablets on a daily basis until your bowel habits are regular.  Constipation can be very uncomfortable, along with straining, after recent abdominal surgery.    ACTIVITY:  You are encouraged to cough and deep breath or use your incentive spirometer if you were given one, every 15-30 minutes when awake.  This will help prevent respiratory complications and low grade fevers post-operatively.  You may want to hug a pillow when coughing and sneezing to add additional support to the surgical area which will decrease pain during these times.  You are encouraged to walk and engage in light activity for the next two weeks.  You should not lift more than 20 pounds during this time frame as it could put you at increased risk for a post-operative hernia.  Twenty pounds is roughly equivalent to a plastic bag of groceries.      MEDICATIONS:  Try to take narcotic medications and anti-inflammatory medications, such as tylenol, ibuprofen, naprosyn, etc., with food.  This will minimize stomach upset from the medication.  Should you develop nausea and vomiting from the pain medication, or develop a rash, please discontinue the medication and contact your physician.  You should not drive, make important decisions, or operate machinery when taking narcotic pain medication.    QUESTIONS:  Please feel free to call your physician or the hospital  if you have any questions, and they will be glad to assist you.    Discharge Instructions    Discharged to home by car with self. Discharged via walking, hospital escort: Refused.  Special equipment needed: Not Applicable    Be sure to schedule a follow-up appointment with your primary care doctor or any specialists as instructed.     Discharge Plan:   Diet Plan: Discussed  Activity Level: Discussed  Confirmed Follow up  Appointment: Patient to Call and Schedule Appointment  Confirmed Symptoms Management: Discussed  Medication Reconciliation Updated: Yes  Influenza Vaccine Indication: Not indicated: Previously immunized this influenza season and > 8 years of age    I understand that a diet low in cholesterol, fat, and sodium is recommended for good health. Unless I have been given specific instructions below for another diet, I accept this instruction as my diet prescription.   Other diet: Regular as tolerated     Special Instructions: None    · Is patient discharged on Warfarin / Coumadin?   No     Depression / Suicide Risk    As you are discharged from this [facility] facility, it is important to learn how to keep safe from harming yourself.    Recognize the warning signs:  · Abrupt changes in personality, positive or negative- including increase in energy   · Giving away possessions  · Change in eating patterns- significant weight changes-  positive or negative  · Change in sleeping patterns- unable to sleep or sleeping all the time   · Unwillingness or inability to communicate  · Depression  · Unusual sadness, discouragement and loneliness  · Talk of wanting to die  · Neglect of personal appearance   · Rebelliousness- reckless behavior  · Withdrawal from people/activities they love  · Confusion- inability to concentrate     If you or a loved one observes any of these behaviors or has concerns about self-harm, here's what you can do:  · Talk about it- your feelings and reasons for harming yourself  · Remove any means that you might use to hurt yourself (examples: pills, rope, extension cords, firearm)  · Get professional help from the community (Mental Health, Substance Abuse, psychological counseling)  · Do not be alone:Call your Safe Contact- someone whom you trust who will be there for you.  · Call your local CRISIS HOTLINE 492-3356 or 016-391-4346  · Call your local Children's Mobile Crisis Response Team [facility]  (568) 746-8494 or www.Graematter  · Call the toll free National Suicide Prevention Hotlines   · National Suicide Prevention Lifeline 151-449-WMIW (7086)  · [employer] 800-SUICIDE (331-8570)    No lifting greater than 20 pounds for 4 weeks after surgery.   Use over the counter laxative of choice if constipated.   Okay to shower, but no baths, hot tubs, or swimming until follow up appointment.   Follow up with  ([facility], [phone]) 10-14 days after surgery.

## 2020-03-22 NOTE — DISCHARGE SUMMARY
DATE OF ADMISSION:  03/21/2020    DATE OF DISCHARGE:  03/22/2020    DIAGNOSIS ON ADMISSION:  1.  Nausea and vomiting postoperatively.  2.  Lactic acidosis.    DISCHARGE DIAGNOSES:  1.  Nausea and vomiting postoperatively.  2.  Lactic acidosis.    HISTORY AND PHYSICAL:  Please see the dictated history and physical.    HOSPITAL COURSE:  Patient was admitted to my service, given IV fluid   overnight.  His lactic acidosis resolved with IV fluids.  His nausea and   vomiting resolved with IV fluids.  He was able to tolerate a regular diet.    His white blood cell count, which was noted to be elevated at the time of his   admission was resolved and became within normal limits.  His abdominal pain,   nausea and vomiting all completely resolved and he was deemed ready for   discharge home on 03/22/2020.    DISCHARGE RECOMMENDATIONS:  He is to follow up with Dr. Orellana as previously   appointed 10-14 days after surgery.  He is to refrain from lifting greater   than 20 pounds for full 4 weeks after surgery.  He is to stop his antibiotics   that he did not fill and may not fill those and he is to use his previously   prescribed pain medication as needed.  He is to use an over-the-counter   laxative if constipated.    DISCHARGE MEDICATIONS:  He will not be discharged with additional medications   at this time.  He will resume his home medications.       ____________________________________     MD KRISTIE Ruiz / SUNSHINE    DD:  03/22/2020 11:00:35  DT:  03/22/2020 16:39:35    D#:  [id number]  Job#:  [id number]

## 2020-03-22 NOTE — ED NOTES
This RN will redraw Lactate when the near 3000cc bolus of LR is finished, spoke with bed control as they had questions. Will send lab and call bed control with number when available

## 2020-03-22 NOTE — PROGRESS NOTES
A&Ox4, VSS. Reporting 5/10 pain, which is a comfortable level for the patient. O2 93% on RA. Denies SOB, chest pain, numbness, & tingling. Reports mild nausea, medicated per MAR. - emesis. Pulses RRR all extremities, bilateral feet 1+ edema, baseline for pt. Upself steady gait.    Call light and belongings within reach. Side rails up x2, bed is in locked and lowest position, and hourly rounding in place. All questions and concerns addressed at this time. All needs met at this time.

## 2020-03-22 NOTE — ED NOTES
Report given to Shay ARMSTRONG. All questions answered. Pt transported via gurney, all belongings with pt.

## 2020-03-25 ENCOUNTER — HOSPITAL ENCOUNTER (EMERGENCY)
Facility: MEDICAL CENTER | Age: 40
End: 2020-03-25
Attending: EMERGENCY MEDICINE
Payer: COMMERCIAL

## 2020-03-25 VITALS
SYSTOLIC BLOOD PRESSURE: 138 MMHG | BODY MASS INDEX: 27.73 KG/M2 | TEMPERATURE: 97.5 F | HEIGHT: 74 IN | HEART RATE: 77 BPM | DIASTOLIC BLOOD PRESSURE: 88 MMHG | RESPIRATION RATE: 18 BRPM | OXYGEN SATURATION: 95 % | WEIGHT: 216.05 LBS

## 2020-03-25 DIAGNOSIS — R10.84 GENERALIZED ABDOMINAL PAIN: ICD-10-CM

## 2020-03-25 LAB
ALBUMIN SERPL BCP-MCNC: 3.9 G/DL (ref 3.2–4.9)
ALBUMIN/GLOB SERPL: 1.2 G/DL
ALP SERPL-CCNC: 61 U/L (ref 30–99)
ALT SERPL-CCNC: 28 U/L (ref 2–50)
ANION GAP SERPL CALC-SCNC: 16 MMOL/L (ref 7–16)
AST SERPL-CCNC: 20 U/L (ref 12–45)
BASOPHILS # BLD AUTO: 0 % (ref 0–1.8)
BASOPHILS # BLD: 0 K/UL (ref 0–0.12)
BILIRUB SERPL-MCNC: 0.2 MG/DL (ref 0.1–1.5)
BUN SERPL-MCNC: 15 MG/DL (ref 8–22)
CALCIUM SERPL-MCNC: 9.6 MG/DL (ref 8.5–10.5)
CHLORIDE SERPL-SCNC: 102 MMOL/L (ref 96–112)
CO2 SERPL-SCNC: 19 MMOL/L (ref 20–33)
CREAT SERPL-MCNC: 0.98 MG/DL (ref 0.5–1.4)
EKG IMPRESSION: NORMAL
EOSINOPHIL # BLD AUTO: 0.71 K/UL (ref 0–0.51)
EOSINOPHIL NFR BLD: 4.3 % (ref 0–6.9)
ERYTHROCYTE [DISTWIDTH] IN BLOOD BY AUTOMATED COUNT: 40.2 FL (ref 35.9–50)
GLOBULIN SER CALC-MCNC: 3.3 G/DL (ref 1.9–3.5)
GLUCOSE SERPL-MCNC: 113 MG/DL (ref 65–99)
HCT VFR BLD AUTO: 49.7 % (ref 42–52)
HGB BLD-MCNC: 16.7 G/DL (ref 14–18)
LACTATE BLD-SCNC: 2.1 MMOL/L (ref 0.5–2)
LIPASE SERPL-CCNC: 50 U/L (ref 11–82)
LYMPHOCYTES # BLD AUTO: 2.39 K/UL (ref 1–4.8)
LYMPHOCYTES NFR BLD: 14.6 % (ref 22–41)
MANUAL DIFF BLD: NORMAL
MCH RBC QN AUTO: 29.3 PG (ref 27–33)
MCHC RBC AUTO-ENTMCNC: 33.6 G/DL (ref 33.7–35.3)
MCV RBC AUTO: 87.2 FL (ref 81.4–97.8)
MONOCYTES # BLD AUTO: 1.28 K/UL (ref 0–0.85)
MONOCYTES NFR BLD AUTO: 7.8 % (ref 0–13.4)
MORPHOLOGY BLD-IMP: NORMAL
NEUTROPHILS # BLD AUTO: 11.87 K/UL (ref 1.82–7.42)
NEUTROPHILS NFR BLD: 71.5 % (ref 44–72)
NEUTS BAND NFR BLD MANUAL: 0.9 % (ref 0–10)
NRBC # BLD AUTO: 0 K/UL
NRBC BLD-RTO: 0 /100 WBC
PLATELET # BLD AUTO: 566 K/UL (ref 164–446)
PLATELET BLD QL SMEAR: NORMAL
PMV BLD AUTO: 8.7 FL (ref 9–12.9)
POTASSIUM SERPL-SCNC: 4.4 MMOL/L (ref 3.6–5.5)
PROMYELOCYTES NFR BLD MANUAL: 0.9 %
PROT SERPL-MCNC: 7.2 G/DL (ref 6–8.2)
RBC # BLD AUTO: 5.7 M/UL (ref 4.7–6.1)
RBC BLD AUTO: NORMAL
SODIUM SERPL-SCNC: 137 MMOL/L (ref 135–145)
TROPONIN T SERPL-MCNC: <6 NG/L (ref 6–19)
WBC # BLD AUTO: 16.4 K/UL (ref 4.8–10.8)

## 2020-03-25 PROCEDURE — 85027 COMPLETE CBC AUTOMATED: CPT

## 2020-03-25 PROCEDURE — 96375 TX/PRO/DX INJ NEW DRUG ADDON: CPT

## 2020-03-25 PROCEDURE — 96374 THER/PROPH/DIAG INJ IV PUSH: CPT

## 2020-03-25 PROCEDURE — 84484 ASSAY OF TROPONIN QUANT: CPT

## 2020-03-25 PROCEDURE — 83605 ASSAY OF LACTIC ACID: CPT

## 2020-03-25 PROCEDURE — 80053 COMPREHEN METABOLIC PANEL: CPT

## 2020-03-25 PROCEDURE — 96376 TX/PRO/DX INJ SAME DRUG ADON: CPT

## 2020-03-25 PROCEDURE — 700111 HCHG RX REV CODE 636 W/ 250 OVERRIDE (IP): Performed by: EMERGENCY MEDICINE

## 2020-03-25 PROCEDURE — 83690 ASSAY OF LIPASE: CPT

## 2020-03-25 PROCEDURE — 85007 BL SMEAR W/DIFF WBC COUNT: CPT

## 2020-03-25 PROCEDURE — 93005 ELECTROCARDIOGRAM TRACING: CPT | Performed by: EMERGENCY MEDICINE

## 2020-03-25 PROCEDURE — 99285 EMERGENCY DEPT VISIT HI MDM: CPT

## 2020-03-25 RX ORDER — MORPHINE SULFATE 4 MG/ML
4 INJECTION, SOLUTION INTRAMUSCULAR; INTRAVENOUS ONCE
Status: COMPLETED | OUTPATIENT
Start: 2020-03-25 | End: 2020-03-25

## 2020-03-25 RX ORDER — DICYCLOMINE HCL 20 MG
20 TABLET ORAL 4 TIMES DAILY PRN
Qty: 20 TAB | Refills: 0 | Status: SHIPPED | OUTPATIENT
Start: 2020-03-25

## 2020-03-25 RX ORDER — METOCLOPRAMIDE 10 MG/1
10 TABLET ORAL 4 TIMES DAILY PRN
Qty: 20 TAB | Refills: 0 | Status: SHIPPED | OUTPATIENT
Start: 2020-03-25

## 2020-03-25 RX ORDER — METOCLOPRAMIDE HYDROCHLORIDE 5 MG/ML
10 INJECTION INTRAMUSCULAR; INTRAVENOUS ONCE
Status: COMPLETED | OUTPATIENT
Start: 2020-03-25 | End: 2020-03-25

## 2020-03-25 RX ADMIN — MORPHINE SULFATE 4 MG: 4 INJECTION INTRAVENOUS at 09:16

## 2020-03-25 RX ADMIN — MORPHINE SULFATE 4 MG: 4 INJECTION INTRAVENOUS at 07:06

## 2020-03-25 RX ADMIN — METOCLOPRAMIDE 10 MG: 5 INJECTION, SOLUTION INTRAMUSCULAR; INTRAVENOUS at 07:06

## 2020-03-25 ASSESSMENT — FIBROSIS 4 INDEX: FIB4 SCORE: 0.54

## 2020-03-25 ASSESSMENT — ENCOUNTER SYMPTOMS
ABDOMINAL PAIN: 1
VOMITING: 1
DIARRHEA: 0

## 2020-03-25 NOTE — ED TRIAGE NOTES
"Alek Lola   39 y.o. male   Chief Complaint   Patient presents with   • RLQ Pain     had an emergency appendectomy on 3-15, says he has been in constant pain from this. was just admitted recently for the same pain following surgery on 3-21.      Pt to triage in  for above complaint. Pt tearful in triage, reports N/V as well, says this has been near constant as well but he tries not to as it makes the pain worse.      Pt is alert and oriented, speaking in full sentences, follows commands and responds appropriately to questions. Resp are even and unlabored.   Pt placed in lobby. Pt educated on triage process. Pt encouraged to alert staff for any changes.    /105   Pulse 95   Temp 36.4 °C (97.5 °F) (Temporal)   Resp 18   Ht 1.88 m (6' 2\")   Wt 98 kg (216 lb 0.8 oz)   SpO2 98%   BMI 27.74 kg/m²     "

## 2020-03-25 NOTE — ED PROVIDER NOTES
ED Provider Note    Scribed for Aram Ziadi M.D. by David King. 3/25/2020, 6:58 AM.    Primary care provider: No primary care provider noted.  Means of arrival: Wheel chair  History obtained from: Patient  History limited by: None    CHIEF COMPLAINT  Chief Complaint   Patient presents with   • RLQ Pain     had an emergency appendectomy on 3-15, says he has been in constant pain from this. was just admitted recently for the same pain following surgery on 3-21.       KRISS Davila is a 39 y.o. male who presents to the Emergency Department for acute, severe abdominal pain onset 10 days ago. Patient had an emergency appendectomy on 3/15, and patient reports having near constant abdominal pain since. He has been taking his prescribed analgesics as well as taking hot showers which has provided mild alleviation. Patient has associated vomiting, but denies any diarrhea. He believes that his pain is related to his pancreatitis.     REVIEW OF SYSTEMS  Review of Systems   Gastrointestinal: Positive for abdominal pain and vomiting. Negative for diarrhea.   All other systems reviewed and are negative.      PAST MEDICAL HISTORY   has a past medical history of Anxiety, Depression, and Pancreatitis.    SURGICAL HISTORY   has a past surgical history that includes lap,appendectomy (N/A, 3/15/2020).    SOCIAL HISTORY  Social History     Tobacco Use   • Smoking status: Never Smoker   • Smokeless tobacco: Never Used   Substance Use Topics   • Alcohol use: Not Currently     Frequency: 2-3 times a week     Comment: social   • Drug use: Never      Social History     Substance and Sexual Activity   Drug Use Never       FAMILY HISTORY  No family history on file.    CURRENT MEDICATIONS  Current Outpatient Medications:   •  escitalopram (LEXAPRO) 10 MG Tab, Take 10 mg by mouth every day., Disp: , Rfl:     ALLERGIES  No Known Allergies    PHYSICAL EXAM  VITAL SIGNS: /105   Pulse 95   Temp 36.4 °C (97.5 °F) (Temporal)    "Resp 18   Ht 1.88 m (6' 2\")   Wt 98 kg (216 lb 0.8 oz)   SpO2 98%   BMI 27.74 kg/m²     Constitutional:  Tearful  HENT:  Moist mucous membranes  Eyes:  No conjunctivitis or icterus  Neck:  trachea is midline, no palpable thyroid  Lymphatic:  No cervical lymphadenopathy  Cardiovascular:  Regular rate and rhythm, no murmurs  Thorax & Lungs:  Normal breath sounds, no rhonchi  Abdomen:  Soft, Non-tender  Skin:.  no rash  Back:  Non-tender, no CVA tenderness  Extremities:   no edema  Vascular:  symmetric radial pulse  Neurologic:  Normal gross motor    LABS  Labs Reviewed   CBC WITH DIFFERENTIAL - Abnormal; Notable for the following components:       Result Value    WBC 16.4 (*)     MCHC 33.6 (*)     Platelet Count 566 (*)     MPV 8.7 (*)     Lymphocytes 14.60 (*)     Neutrophils (Absolute) 11.87 (*)     Monos (Absolute) 1.28 (*)     Eos (Absolute) 0.71 (*)     All other components within normal limits   COMP METABOLIC PANEL - Abnormal; Notable for the following components:    Co2 19 (*)     Glucose 113 (*)     All other components within normal limits   LACTIC ACID - Abnormal; Notable for the following components:    Lactic Acid 2.1 (*)     All other components within normal limits   LIPASE   TROPONIN   ESTIMATED GFR   DIFFERENTIAL MANUAL   PERIPHERAL SMEAR REVIEW   PLATELET ESTIMATE   MORPHOLOGY     All labs reviewed by me.    EKG Interpretation    Interpreted by me    Rhythm: normal sinus   Rate: normal  Axis: normal  Ectopy: none  Conduction: normal  ST Segments: no acute change  T Waves: no acute change  Q Waves: none    Clinical Impression: no acute changes and normal EKG    COURSE & MEDICAL DECISION MAKING  Pertinent Labs & Imaging studies reviewed. (See chart for details)    6:58 AM - Patient seen and examined at bedside. Patient will be treated with Reglan 10 mg for possible intestinal colic. Morphine 4 mg given for pain. Ordered troponin, CBC with diff, CMP, lipase, lactic acid, estimated GFR, differential " manual, peripheral smear review, platelet estimated, morphology, and EKG.    9:04 AM - Patient was reevaluated at bedside. Discussed lab results with the patient and informed them that they had an elevated WBC, but were otherwise reassuring. He reports very little improvement of pain. Patient treated with another morphine 4 mg.     9:46 AM - Hospitalist called.     10:32 AM - I discussed the patient's case and the above findings with Dr. Maryellen Logan (Hospitalist) who agrees to evaluate the patient for hospitalization if recommended by Surgery.    10:44 AM - I discussed the patient's case and the above findings with Dr. Orellana (Surgery) who does not feel the patient requires hospitalization and will follow with the patient in outpatient setting.    PPE Note: I personally donned full PPE for all patient encounters during this visit, including being clean-shaven with an N95 respirator mask, gloves, gown, and goggles.     Scribe remained outside the patient's room and did not have any contact with the patient for the duration of patient encounter.     Medical Decision Making:   Patient had no reproducible pain on exam elevated white count he was given pain medications with good relief he stated did return he is given a second dosage and was requesting to be admitted.  I did contact Dr. Parson for a reviewed his case and feel he is better off being discharged patient is agreeable to that I will place him on Bentyl Reglan and fiber for functional abdominal pain he is given return precautions and follow-up    The patient will return for new or worsening symptoms and is stable at the time of discharge.    The patient is referred to a primary physician for blood pressure management, diabetic screening, and for all other preventative health concerns.    DISPOSITION:  Patient will be discharged home in stable condition.     FOLLOW UP:  [address] [address]  [phone]          OUTPATIENT  MEDICATIONS:  New Prescriptions    DICYCLOMINE (BENTYL) 20 MG TAB    Take 1 Tab by mouth 4 times a day as needed (abdominal pain).    METOCLOPRAMIDE (REGLAN) 10 MG TAB    Take 1 Tab by mouth 4 times a day as needed (prn nausea).       FINAL IMPRESSION  1. Generalized abdominal pain          David POSADAS (Scribe), am scribing for, and in the presence of, Aram Zaidi M.D..    Electronically signed by: David King (Scribe), 3/25/2020    IAram M.D. personally performed the services described in this documentation, as scribed by David King in my presence, and it is both accurate and complete. C.    The note accurately reflects work and decisions made by me.  Aram Zaidi M.D.  3/25/2020  12:21 PM

## 2020-03-25 NOTE — DISCHARGE INSTRUCTIONS
Your white count is elevated but your exam does not show any suspicion for significant inflammation so we will hold off CAT scan.  I am to treat you for intestinal cramping type pain with antispasmodic antinauseant and is important to take Benefiber as directed until you notice a stool change.  This may help.  This not a guarantee that this is your problem and if you have worsening symptoms is important to return immediately.  I am giving you referral follow-up

## 2020-03-25 NOTE — ED NOTES
Pt resting in bed at this time. Tearful due to pain. Respirations even and unlabored. Call light in reach. Will continue to monitor.

## 2020-04-03 ENCOUNTER — HOSPITAL ENCOUNTER (EMERGENCY)
Facility: MEDICAL CENTER | Age: 40
End: 2020-04-03
Attending: EMERGENCY MEDICINE
Payer: COMMERCIAL

## 2020-04-03 ENCOUNTER — APPOINTMENT (OUTPATIENT)
Dept: RADIOLOGY | Facility: MEDICAL CENTER | Age: 40
End: 2020-04-03
Attending: EMERGENCY MEDICINE
Payer: COMMERCIAL

## 2020-04-03 VITALS
RESPIRATION RATE: 12 BRPM | BODY MASS INDEX: 27.78 KG/M2 | DIASTOLIC BLOOD PRESSURE: 84 MMHG | HEART RATE: 84 BPM | WEIGHT: 216.49 LBS | OXYGEN SATURATION: 98 % | SYSTOLIC BLOOD PRESSURE: 138 MMHG | TEMPERATURE: 97.8 F | HEIGHT: 74 IN

## 2020-04-03 DIAGNOSIS — R10.31 RIGHT LOWER QUADRANT ABDOMINAL PAIN: ICD-10-CM

## 2020-04-03 LAB
ALBUMIN SERPL BCP-MCNC: 4.2 G/DL (ref 3.2–4.9)
ALBUMIN/GLOB SERPL: 1.4 G/DL
ALP SERPL-CCNC: 58 U/L (ref 30–99)
ALT SERPL-CCNC: 21 U/L (ref 2–50)
ANION GAP SERPL CALC-SCNC: 15 MMOL/L (ref 7–16)
APPEARANCE UR: CLEAR
AST SERPL-CCNC: 13 U/L (ref 12–45)
BASOPHILS # BLD AUTO: 0.4 % (ref 0–1.8)
BASOPHILS # BLD: 0.05 K/UL (ref 0–0.12)
BILIRUB SERPL-MCNC: 0.3 MG/DL (ref 0.1–1.5)
BILIRUB UR QL STRIP.AUTO: NEGATIVE
BUN SERPL-MCNC: 9 MG/DL (ref 8–22)
CALCIUM SERPL-MCNC: 9.6 MG/DL (ref 8.5–10.5)
CHLORIDE SERPL-SCNC: 105 MMOL/L (ref 96–112)
CO2 SERPL-SCNC: 20 MMOL/L (ref 20–33)
COLOR UR: YELLOW
CREAT SERPL-MCNC: 1.03 MG/DL (ref 0.5–1.4)
EOSINOPHIL # BLD AUTO: 0.29 K/UL (ref 0–0.51)
EOSINOPHIL NFR BLD: 2 % (ref 0–6.9)
ERYTHROCYTE [DISTWIDTH] IN BLOOD BY AUTOMATED COUNT: 42.4 FL (ref 35.9–50)
GLOBULIN SER CALC-MCNC: 3 G/DL (ref 1.9–3.5)
GLUCOSE SERPL-MCNC: 119 MG/DL (ref 65–99)
GLUCOSE UR STRIP.AUTO-MCNC: NEGATIVE MG/DL
HCT VFR BLD AUTO: 47.1 % (ref 42–52)
HGB BLD-MCNC: 15.8 G/DL (ref 14–18)
IMM GRANULOCYTES # BLD AUTO: 0.08 K/UL (ref 0–0.11)
IMM GRANULOCYTES NFR BLD AUTO: 0.6 % (ref 0–0.9)
KETONES UR STRIP.AUTO-MCNC: NEGATIVE MG/DL
LEUKOCYTE ESTERASE UR QL STRIP.AUTO: NEGATIVE
LIPASE SERPL-CCNC: 39 U/L (ref 11–82)
LYMPHOCYTES # BLD AUTO: 2.78 K/UL (ref 1–4.8)
LYMPHOCYTES NFR BLD: 19.5 % (ref 22–41)
MCH RBC QN AUTO: 29.7 PG (ref 27–33)
MCHC RBC AUTO-ENTMCNC: 33.5 G/DL (ref 33.7–35.3)
MCV RBC AUTO: 88.5 FL (ref 81.4–97.8)
MICRO URNS: NORMAL
MONOCYTES # BLD AUTO: 0.85 K/UL (ref 0–0.85)
MONOCYTES NFR BLD AUTO: 6 % (ref 0–13.4)
NEUTROPHILS # BLD AUTO: 10.23 K/UL (ref 1.82–7.42)
NEUTROPHILS NFR BLD: 71.5 % (ref 44–72)
NITRITE UR QL STRIP.AUTO: NEGATIVE
NRBC # BLD AUTO: 0 K/UL
NRBC BLD-RTO: 0 /100 WBC
PH UR STRIP.AUTO: 6.5 [PH] (ref 5–8)
PLATELET # BLD AUTO: 389 K/UL (ref 164–446)
PMV BLD AUTO: 9.1 FL (ref 9–12.9)
POTASSIUM SERPL-SCNC: 4.2 MMOL/L (ref 3.6–5.5)
PROT SERPL-MCNC: 7.2 G/DL (ref 6–8.2)
PROT UR QL STRIP: NEGATIVE MG/DL
RBC # BLD AUTO: 5.32 M/UL (ref 4.7–6.1)
RBC UR QL AUTO: NEGATIVE
SODIUM SERPL-SCNC: 140 MMOL/L (ref 135–145)
SP GR UR STRIP.AUTO: 1.02
UROBILINOGEN UR STRIP.AUTO-MCNC: 0.2 MG/DL
WBC # BLD AUTO: 14.3 K/UL (ref 4.8–10.8)

## 2020-04-03 PROCEDURE — 700111 HCHG RX REV CODE 636 W/ 250 OVERRIDE (IP): Performed by: EMERGENCY MEDICINE

## 2020-04-03 PROCEDURE — 700105 HCHG RX REV CODE 258: Performed by: EMERGENCY MEDICINE

## 2020-04-03 PROCEDURE — 99285 EMERGENCY DEPT VISIT HI MDM: CPT

## 2020-04-03 PROCEDURE — 96375 TX/PRO/DX INJ NEW DRUG ADDON: CPT

## 2020-04-03 PROCEDURE — 83690 ASSAY OF LIPASE: CPT

## 2020-04-03 PROCEDURE — 81003 URINALYSIS AUTO W/O SCOPE: CPT

## 2020-04-03 PROCEDURE — 85025 COMPLETE CBC W/AUTO DIFF WBC: CPT

## 2020-04-03 PROCEDURE — 80053 COMPREHEN METABOLIC PANEL: CPT

## 2020-04-03 PROCEDURE — 96374 THER/PROPH/DIAG INJ IV PUSH: CPT

## 2020-04-03 PROCEDURE — 74176 CT ABD & PELVIS W/O CONTRAST: CPT

## 2020-04-03 PROCEDURE — 700102 HCHG RX REV CODE 250 W/ 637 OVERRIDE(OP): Performed by: EMERGENCY MEDICINE

## 2020-04-03 PROCEDURE — A9270 NON-COVERED ITEM OR SERVICE: HCPCS | Performed by: EMERGENCY MEDICINE

## 2020-04-03 RX ORDER — HALOPERIDOL 5 MG/ML
5 INJECTION INTRAMUSCULAR ONCE
Status: COMPLETED | OUTPATIENT
Start: 2020-04-03 | End: 2020-04-03

## 2020-04-03 RX ORDER — SODIUM CHLORIDE 9 MG/ML
1000 INJECTION, SOLUTION INTRAVENOUS ONCE
Status: COMPLETED | OUTPATIENT
Start: 2020-04-03 | End: 2020-04-03

## 2020-04-03 RX ORDER — OXYCODONE HYDROCHLORIDE AND ACETAMINOPHEN 5; 325 MG/1; MG/1
1 TABLET ORAL ONCE
Status: COMPLETED | OUTPATIENT
Start: 2020-04-03 | End: 2020-04-03

## 2020-04-03 RX ORDER — DIPHENHYDRAMINE HYDROCHLORIDE 50 MG/ML
50 INJECTION INTRAMUSCULAR; INTRAVENOUS ONCE
Status: COMPLETED | OUTPATIENT
Start: 2020-04-03 | End: 2020-04-03

## 2020-04-03 RX ORDER — KETOROLAC TROMETHAMINE 30 MG/ML
30 INJECTION, SOLUTION INTRAMUSCULAR; INTRAVENOUS ONCE
Status: COMPLETED | OUTPATIENT
Start: 2020-04-03 | End: 2020-04-03

## 2020-04-03 RX ORDER — IBUPROFEN 600 MG/1
600 TABLET ORAL ONCE
Status: COMPLETED | OUTPATIENT
Start: 2020-04-03 | End: 2020-04-03

## 2020-04-03 RX ORDER — ONDANSETRON 2 MG/ML
4 INJECTION INTRAMUSCULAR; INTRAVENOUS ONCE
Status: COMPLETED | OUTPATIENT
Start: 2020-04-03 | End: 2020-04-03

## 2020-04-03 RX ADMIN — SODIUM CHLORIDE 1000 ML: 9 INJECTION, SOLUTION INTRAVENOUS at 09:50

## 2020-04-03 RX ADMIN — HALOPERIDOL LACTATE 5 MG: 5 INJECTION, SOLUTION INTRAMUSCULAR at 10:05

## 2020-04-03 RX ADMIN — KETOROLAC TROMETHAMINE 30 MG: 30 INJECTION, SOLUTION INTRAMUSCULAR at 09:51

## 2020-04-03 RX ADMIN — OXYCODONE HYDROCHLORIDE AND ACETAMINOPHEN 1 TABLET: 5; 325 TABLET ORAL at 13:31

## 2020-04-03 RX ADMIN — IBUPROFEN 600 MG: 600 TABLET ORAL at 13:31

## 2020-04-03 RX ADMIN — ONDANSETRON 4 MG: 2 INJECTION INTRAMUSCULAR; INTRAVENOUS at 09:50

## 2020-04-03 RX ADMIN — DIPHENHYDRAMINE HYDROCHLORIDE 50 MG: 50 INJECTION INTRAMUSCULAR; INTRAVENOUS at 09:50

## 2020-04-03 ASSESSMENT — FIBROSIS 4 INDEX: FIB4 SCORE: 0.27

## 2020-04-03 NOTE — ED TRIAGE NOTES
"Chief Complaint   Patient presents with   • Flank Pain     R side intermittent pain started today. Pt states he had his appendix removed ~2 weeks ago and was cleared by Dr Orellana but reports similar episode 1 week ago.   • N/V     BP (!) 177/111   Pulse 89   Temp 36.6 °C (97.8 °F) (Oral)   Resp 16   Ht 1.88 m (6' 2\")   Wt 98.2 kg (216 lb 7.9 oz)   SpO2 97%   BMI 27.80 kg/m²     Pt ambulated into triage, mask provided. VS as above, NAD, encouraged to return to the triage nurse or tech with any new complaints or symptoms. Urine cup given.  "

## 2020-04-03 NOTE — ED PROVIDER NOTES
ED Provider Note    CHIEF COMPLAINT  Chief Complaint   Patient presents with   • Flank Pain     R side intermittent pain started today. Pt states he had his appendix removed ~2 weeks ago and was cleared by Dr Orellana but reports similar episode 1 week ago.   • N/V       HPI  Alek Davila is a 39 y.o. male who presents with right flank pain.  The patient states that he had an appendectomy performed about a week ago and had a follow-up appointment with the surgeon yesterday and everything was going well.  This morning he states he awoken at severe sharp pain to the right flank into the right groin.  He describes the pain is sharp and severe in intensity.  He is unaware of any exacerbating or relieving factors.  He does have associated nausea and vomiting.  He states the pain is what he had prior to his appendectomy a week ago.  He denies difficulties with urination.  He states he has been a little bit constipated.  He is unaware of any sick contacts.  He has not had any associated fevers.  He states he does have a history of recurrent pancreatitis.    REVIEW OF SYSTEMS  See HPI for further details. All other systems are negative.     PAST MEDICAL HISTORY  Past Medical History:   Diagnosis Date   • Anxiety    • Depression    • Pancreatitis        FAMILY HISTORY  [unfilled]    SOCIAL HISTORY  Social History     Socioeconomic History   • Marital status: Single     Spouse name: Not on file   • Number of children: Not on file   • Years of education: Not on file   • Highest education level: Not on file   Occupational History   • Not on file   Social Needs   • Financial resource strain: Not on file   • Food insecurity     Worry: Not on file     Inability: Not on file   • Transportation needs     Medical: Not on file     Non-medical: Not on file   Tobacco Use   • Smoking status: Never Smoker   • Smokeless tobacco: Never Used   Substance and Sexual Activity   • Alcohol use: Yes     Frequency: 2-3 times a week     Comment: social  "  • Drug use: Never   • Sexual activity: Not on file   Lifestyle   • Physical activity     Days per week: Not on file     Minutes per session: Not on file   • Stress: Not on file   Relationships   • Social connections     Talks on phone: Not on file     Gets together: Not on file     Attends Yazdanism service: Not on file     Active member of club or organization: Not on file     Attends meetings of clubs or organizations: Not on file     Relationship status: Not on file   • Intimate partner violence     Fear of current or ex partner: Not on file     Emotionally abused: Not on file     Physically abused: Not on file     Forced sexual activity: Not on file   Other Topics Concern   • Not on file   Social History Narrative   • Not on file       SURGICAL HISTORY  Past Surgical History:   Procedure Laterality Date   • PB LAP,APPENDECTOMY N/A 3/15/2020    Procedure: APPENDECTOMY, LAPAROSCOPIC;  Surgeon: Leonardo Orellana M.D.;  Location: SURGERY [facility];  Service: General       CURRENT MEDICATIONS  Home Medications     Reviewed by Janis Ray R.N. (Registered Nurse) on 04/03/20 at 0920  Med List Status: Not Addressed   Medication Last Dose Status   dicyclomine (BENTYL) 20 MG Tab  Active   escitalopram (LEXAPRO) 10 MG Tab  Active   metoclopramide (REGLAN) 10 MG Tab  Active                ALLERGIES  No Known Allergies    PHYSICAL EXAM  VITAL SIGNS: BP (!) 177/111   Pulse 89   Temp 36.6 °C (97.8 °F) (Oral)   Resp 16   Ht 1.88 m (6' 2\")   Wt 98.2 kg (216 lb 7.9 oz)   SpO2 97%   BMI 27.80 kg/m²       Constitutional: In acute distress, Non-toxic appearance.   HENT: Normocephalic, Atraumatic, Bilateral external ears normal, Oropharynx moist, No oral exudates, Nose normal.   Eyes: PERRLA, EOMI, Conjunctiva normal, No discharge.   Neck: Normal range of motion, No tenderness, Supple, No stridor.   Lymphatic: No lymphadenopathy noted.   Cardiovascular: Normal heart rate, Normal rhythm, No murmurs, No rubs, No " gallops.   Thorax & Lungs: Normal breath sounds, No respiratory distress, No wheezing, No chest tenderness.   Abdomen: Right mid and lower quadrant discomfort to deep palpation, normal bowel sounds, no distention, no guarding, no rebound  Skin: Warm, Dry, No erythema, No rash.   Back: No tenderness, No CVA tenderness.   Extremities: Intact distal pulses, No edema, No tenderness, No cyanosis, No clubbing.   Neurologic: Alert & oriented x 3, Normal motor function, Normal sensory function, No focal deficits noted.   Psychiatric: Affect normal, Judgment normal, Mood normal.     CT-RENAL COLIC EVALUATION(A/P W/O)   Final Result         1. No urinary tract calculus identified. No renal collecting system in dilatation.      2. The appendix is surgically absent.      Mild wall thickening in the cecum and stranding in the right lower quadrant could relate to recent postsurgical change. Some degree of colitis is possible. No fluid collections seen.          COURSE & MEDICAL DECISION MAKING  Pertinent Labs & Imaging studies reviewed. (See chart for details)  This is a 39-year-old male who presents to the emergency department with abdominal discomfort.  The patient had an appendectomy done a week ago.  Therefore I did perform a CT scan makes there is no evidence of an abscess.  I also performed a CT scan and x-ray there is no evidence of ureterolithiasis.  The CT scan does not show any evidence of urolithiasis no evidence of intra-abdominal inflammation.  The patient's urinalysis is normal.  At this time I do not have a clear source for his pain.  The patient's abdomen is nonsurgical.  At the time of discharge he continues have some pain but his abdomen continues to be nonsurgical.  He will receive a one-time dose of Percocet for acute pain control.  The patient be discharged home on instructions to take Motrin every 8 hours and return in 24 hours if he is not better.    FINAL IMPRESSION  1.  Abdominal pain    Disposition  The  patient will be discharged in stable condition      Electronically signed by: Angelito Tam M.D., 4/3/2020 9:33 AM

## 2020-04-05 ENCOUNTER — HOSPITAL ENCOUNTER (EMERGENCY)
Facility: MEDICAL CENTER | Age: 40
End: 2020-04-05
Attending: EMERGENCY MEDICINE
Payer: COMMERCIAL

## 2020-04-05 VITALS
BODY MASS INDEX: 27.73 KG/M2 | WEIGHT: 216.05 LBS | TEMPERATURE: 96.9 F | SYSTOLIC BLOOD PRESSURE: 172 MMHG | HEIGHT: 74 IN | DIASTOLIC BLOOD PRESSURE: 89 MMHG | HEART RATE: 88 BPM | OXYGEN SATURATION: 95 % | RESPIRATION RATE: 18 BRPM

## 2020-04-05 DIAGNOSIS — R11.2 NAUSEA AND VOMITING, INTRACTABILITY OF VOMITING NOT SPECIFIED, UNSPECIFIED VOMITING TYPE: ICD-10-CM

## 2020-04-05 DIAGNOSIS — R10.9 ABDOMINAL PAIN, UNSPECIFIED ABDOMINAL LOCATION: ICD-10-CM

## 2020-04-05 DIAGNOSIS — F41.9 ANXIETY: ICD-10-CM

## 2020-04-05 LAB
ALBUMIN SERPL BCP-MCNC: 4 G/DL (ref 3.2–4.9)
ALBUMIN/GLOB SERPL: 1.4 G/DL
ALP SERPL-CCNC: 48 U/L (ref 30–99)
ALT SERPL-CCNC: 20 U/L (ref 2–50)
ANION GAP SERPL CALC-SCNC: 13 MMOL/L (ref 7–16)
AST SERPL-CCNC: 13 U/L (ref 12–45)
BASOPHILS # BLD AUTO: 0.6 % (ref 0–1.8)
BASOPHILS # BLD: 0.07 K/UL (ref 0–0.12)
BILIRUB SERPL-MCNC: 0.8 MG/DL (ref 0.1–1.5)
BUN SERPL-MCNC: 13 MG/DL (ref 8–22)
CALCIUM SERPL-MCNC: 9.2 MG/DL (ref 8.5–10.5)
CHLORIDE SERPL-SCNC: 104 MMOL/L (ref 96–112)
CO2 SERPL-SCNC: 21 MMOL/L (ref 20–33)
CREAT SERPL-MCNC: 0.99 MG/DL (ref 0.5–1.4)
EOSINOPHIL # BLD AUTO: 0.27 K/UL (ref 0–0.51)
EOSINOPHIL NFR BLD: 2.4 % (ref 0–6.9)
ERYTHROCYTE [DISTWIDTH] IN BLOOD BY AUTOMATED COUNT: 41.6 FL (ref 35.9–50)
GLOBULIN SER CALC-MCNC: 2.8 G/DL (ref 1.9–3.5)
GLUCOSE SERPL-MCNC: 128 MG/DL (ref 65–99)
HCT VFR BLD AUTO: 45.1 % (ref 42–52)
HGB BLD-MCNC: 14.9 G/DL (ref 14–18)
IMM GRANULOCYTES # BLD AUTO: 0.08 K/UL (ref 0–0.11)
IMM GRANULOCYTES NFR BLD AUTO: 0.7 % (ref 0–0.9)
LIPASE SERPL-CCNC: 31 U/L (ref 11–82)
LYMPHOCYTES # BLD AUTO: 2.77 K/UL (ref 1–4.8)
LYMPHOCYTES NFR BLD: 24.8 % (ref 22–41)
MCH RBC QN AUTO: 29.2 PG (ref 27–33)
MCHC RBC AUTO-ENTMCNC: 33 G/DL (ref 33.7–35.3)
MCV RBC AUTO: 88.3 FL (ref 81.4–97.8)
MONOCYTES # BLD AUTO: 0.82 K/UL (ref 0–0.85)
MONOCYTES NFR BLD AUTO: 7.3 % (ref 0–13.4)
NEUTROPHILS # BLD AUTO: 7.18 K/UL (ref 1.82–7.42)
NEUTROPHILS NFR BLD: 64.2 % (ref 44–72)
NRBC # BLD AUTO: 0 K/UL
NRBC BLD-RTO: 0 /100 WBC
PLATELET # BLD AUTO: 336 K/UL (ref 164–446)
PMV BLD AUTO: 9.1 FL (ref 9–12.9)
POTASSIUM SERPL-SCNC: 4 MMOL/L (ref 3.6–5.5)
PROT SERPL-MCNC: 6.8 G/DL (ref 6–8.2)
RBC # BLD AUTO: 5.11 M/UL (ref 4.7–6.1)
SODIUM SERPL-SCNC: 138 MMOL/L (ref 135–145)
WBC # BLD AUTO: 11.2 K/UL (ref 4.8–10.8)

## 2020-04-05 PROCEDURE — 80053 COMPREHEN METABOLIC PANEL: CPT

## 2020-04-05 PROCEDURE — 700105 HCHG RX REV CODE 258: Performed by: EMERGENCY MEDICINE

## 2020-04-05 PROCEDURE — 96374 THER/PROPH/DIAG INJ IV PUSH: CPT

## 2020-04-05 PROCEDURE — 83690 ASSAY OF LIPASE: CPT

## 2020-04-05 PROCEDURE — 700111 HCHG RX REV CODE 636 W/ 250 OVERRIDE (IP): Performed by: EMERGENCY MEDICINE

## 2020-04-05 PROCEDURE — 700102 HCHG RX REV CODE 250 W/ 637 OVERRIDE(OP): Performed by: EMERGENCY MEDICINE

## 2020-04-05 PROCEDURE — 36415 COLL VENOUS BLD VENIPUNCTURE: CPT

## 2020-04-05 PROCEDURE — 99285 EMERGENCY DEPT VISIT HI MDM: CPT

## 2020-04-05 PROCEDURE — 85025 COMPLETE CBC W/AUTO DIFF WBC: CPT

## 2020-04-05 PROCEDURE — 96375 TX/PRO/DX INJ NEW DRUG ADDON: CPT

## 2020-04-05 PROCEDURE — A9270 NON-COVERED ITEM OR SERVICE: HCPCS | Performed by: EMERGENCY MEDICINE

## 2020-04-05 RX ORDER — SODIUM CHLORIDE, SODIUM LACTATE, POTASSIUM CHLORIDE, CALCIUM CHLORIDE 600; 310; 30; 20 MG/100ML; MG/100ML; MG/100ML; MG/100ML
1000 INJECTION, SOLUTION INTRAVENOUS ONCE
Status: COMPLETED | OUTPATIENT
Start: 2020-04-05 | End: 2020-04-05

## 2020-04-05 RX ORDER — METOCLOPRAMIDE HYDROCHLORIDE 5 MG/ML
10 INJECTION INTRAMUSCULAR; INTRAVENOUS ONCE
Status: COMPLETED | OUTPATIENT
Start: 2020-04-05 | End: 2020-04-05

## 2020-04-05 RX ORDER — KETOROLAC TROMETHAMINE 30 MG/ML
15 INJECTION, SOLUTION INTRAMUSCULAR; INTRAVENOUS ONCE
Status: COMPLETED | OUTPATIENT
Start: 2020-04-05 | End: 2020-04-05

## 2020-04-05 RX ORDER — HYDROCODONE BITARTRATE AND ACETAMINOPHEN 5; 325 MG/1; MG/1
1 TABLET ORAL ONCE
Status: COMPLETED | OUTPATIENT
Start: 2020-04-05 | End: 2020-04-05

## 2020-04-05 RX ADMIN — SODIUM CHLORIDE, POTASSIUM CHLORIDE, SODIUM LACTATE AND CALCIUM CHLORIDE 1000 ML: 600; 310; 30; 20 INJECTION, SOLUTION INTRAVENOUS at 08:02

## 2020-04-05 RX ADMIN — METOCLOPRAMIDE 10 MG: 5 INJECTION, SOLUTION INTRAMUSCULAR; INTRAVENOUS at 07:59

## 2020-04-05 RX ADMIN — HYDROCODONE BITARTRATE AND ACETAMINOPHEN 1 TABLET: 5; 325 TABLET ORAL at 09:47

## 2020-04-05 RX ADMIN — KETOROLAC TROMETHAMINE 15 MG: 30 INJECTION, SOLUTION INTRAMUSCULAR at 08:05

## 2020-04-05 ASSESSMENT — FIBROSIS 4 INDEX: FIB4 SCORE: 0.3

## 2020-04-05 NOTE — ED TRIAGE NOTES
Chief Complaint   Patient presents with   • N/V     x 2 days   • Abdominal Pain     in right side radiate to back x 2 days. already had his appendix removed.      Has hx of pancreatitis pt states symptoms feels the same. Denies travel/exposure to covid positive pt. Denies any respiratory symptoms. Denies any recent drink of alcohol. Educated on triage process.

## 2020-04-05 NOTE — ED NOTES
Pt mother called to check up on him she states that he does not think he is getting the care he need. permission given by pt to speak with his mother/ mother is updated that based on test pt may be discharged to follow up with pcp. Mother understands and agreeable.   When asked pt about care he states he is scared to go home because his intermittent pain and n/v. erp returned to bedside to speak with pt.   Rn discussed with him the importance of taking medications as prescribed and to refrain from using marijuana.

## 2020-04-05 NOTE — ED NOTES
Ambulated to room. Pt had appendix removed 2 weeks ago. Intermittent n/v since. Pt has been seen in ed several times for this.

## 2020-04-05 NOTE — ED PROVIDER NOTES
ED Provider Note    ER PROVIDER NOTE        CHIEF COMPLAINT  Chief Complaint   Patient presents with   • N/V     x 2 days   • Abdominal Pain     in right side radiate to back x 2 days. already had his appendix removed.        HPI  Alek Davila is a 39 y.o. male who presents to the emergency department complaining of abdominal pain nausea vomiting.  Patient reports his pain is primarily right-sided, with some radiation towards his back.  It is been present for a few weeks at this point.  Patient had appendectomy on 314, has had recurrent episodes of pain ever since.  There are no real alleviating or aggravating factors, states he thinks has pancreatitis.  Patient also endorses intermittent nausea and vomiting, no blood in his emesis.  He denies any diarrhea or constipation.  No blood in his stool.  No fevers or chills.  No cough congestion or chest pain.    No recent travel or high-risk exposures    REVIEW OF SYSTEMS  Pertinent positives include abdominal pain, vomiting. Pertinent negatives include no fever. See HPI for details. All other systems reviewed and are negative.    PAST MEDICAL HISTORY   has a past medical history of Anxiety, Depression, and Pancreatitis.    SURGICAL HISTORY   has a past surgical history that includes lap,appendectomy (N/A, 3/15/2020).    FAMILY HISTORY  No family history on file.    SOCIAL HISTORY  Social History     Socioeconomic History   • Marital status: Single     Spouse name: Not on file   • Number of children: Not on file   • Years of education: Not on file   • Highest education level: Not on file   Occupational History   • Not on file   Social Needs   • Financial resource strain: Not on file   • Food insecurity     Worry: Not on file     Inability: Not on file   • Transportation needs     Medical: Not on file     Non-medical: Not on file   Tobacco Use   • Smoking status: Never Smoker   • Smokeless tobacco: Never Used   Substance and Sexual Activity   • Alcohol use: Yes      "Frequency: 2-3 times a week     Comment: social   • Drug use: Never   • Sexual activity: Not on file   Lifestyle   • Physical activity     Days per week: Not on file     Minutes per session: Not on file   • Stress: Not on file   Relationships   • Social connections     Talks on phone: Not on file     Gets together: Not on file     Attends Faith service: Not on file     Active member of club or organization: Not on file     Attends meetings of clubs or organizations: Not on file     Relationship status: Not on file   • Intimate partner violence     Fear of current or ex partner: Not on file     Emotionally abused: Not on file     Physically abused: Not on file     Forced sexual activity: Not on file   Other Topics Concern   • Not on file   Social History Narrative   • Not on file      Social History     Substance and Sexual Activity   Drug Use Never       CURRENT MEDICATIONS  Home Medications     Reviewed by Frances Arshad R.N. (Registered Nurse) on 04/05/20 at 0723  Med List Status: Complete   Medication Last Dose Status   dicyclomine (BENTYL) 20 MG Tab not taking Active   escitalopram (LEXAPRO) 10 MG Tab not taking Active   metoclopramide (REGLAN) 10 MG Tab not taking Active                ALLERGIES  No Known Allergies    PHYSICAL EXAM  VITAL SIGNS: BP (!) 172/104   Pulse 76   Temp 35.8 °C (96.5 °F) (Temporal)   Resp (!) 10   Ht 1.88 m (6' 2\")   Wt 98 kg (216 lb 0.8 oz)   SpO2 96%   BMI 27.74 kg/m²   Pulse ox interpretation: I interpret this pulse ox as normal.    Constitutional: Alert in no apparent distress.  HENT: No signs of trauma, Bilateral external ears normal, Nose normal.   Eyes: Pupils are equal and reactive, Conjunctiva normal, Non-icteric.   Neck: Normal range of motion, No tenderness, Supple, No stridor.   Lymphatic: No lymphadenopathy noted.   Cardiovascular: Regular rate and rhythm, no murmurs.   Thorax & Lungs: Normal breath sounds, No respiratory distress, No wheezing, No chest " tenderness.   Abdomen: Bowel sounds normal, Soft, mild periumbilical/right sided lower tenderness, No masses, No pulsatile masses. No peritoneal signs.  Skin: Warm, Dry, No erythema, No rash.   Back: No bony tenderness, No CVA tenderness.   Extremities: Intact distal pulses, No edema, No tenderness, No cyanosis  Musculoskeletal: Good range of motion in all major joints. No tenderness to palpation or major deformities noted.   Neurologic: Alert , Normal motor function, Normal sensory function, No focal deficits noted.   Psychiatric: Affect normal, Judgment normal, Mood normal.     DIAGNOSTIC STUDIES / PROCEDURES      LABS  Labs Reviewed   CBC WITH DIFFERENTIAL - Abnormal; Notable for the following components:       Result Value    WBC 11.2 (*)     MCHC 33.0 (*)     All other components within normal limits   COMP METABOLIC PANEL - Abnormal; Notable for the following components:    Glucose 128 (*)     All other components within normal limits   LIPASE   ESTIMATED GFR       All labs reviewed by me.    RADIOLOGY  No orders to display     The radiologist's interpretation of all radiological studies have been reviewed by me.    COURSE & MEDICAL DECISION MAKING  Nursing notes, VS, PMSFHx reviewed in chart.    7:38 AM Patient seen and examined at bedside. Patient will be treated with Reglan, ketorolac, IV fluid. Ordered for CBC, CMP, lipase to evaluate his symptoms.     In review of patient records he was seen 2 days ago for the same, had CT scan that was unremarkable without evidence of abscess ureteral stone, or any surgical process    Additional visit on 3/25 for the same, white blood cell count 16, normal lipase and otherwise normal labs      Additional visit on 3/21 for the same with unremarkable CT, white blood cell count of 13, was admitted, and improved with symptomatic care      9:49 AM  Patient reevaluated, he has no more vomiting, pain is still present although improved, plan for discharge     I was wearing  appropriate PPE every time I entered the room.    Patient was given IV fluids due to his acute vomiting, oral fluids were not given because of his vomiting and inability to tolerate this, on reevaluation patient was improved    Decision Making:  This is a 39 y.o. male presenting with abdominal pain nausea vomiting.  This appears to be a recurrent process for the patient, he has had several presentations in the past few weeks to the hospital similar.  In review of his records as well as his diagnostics performed today I see no evidence of acute or surgical process.  His white blood cell count is actually decreasing.  His lipase is normal.  His electrolytes are all reassuring.  He just recently had CT scan 2 days ago so I do not see an indication for repeat imaging.  Seems unlikely to be abscess, obstruction, perforation or other postsurgical or surgical complication.  Patient is continually stating he thinks this is his pancreas in review of his records as well as diagnostics I see no evidence of this either radiographically or in his labs.  I discussed the importance of home care with the patient taking his medications as directed, and he will follow-up with his surgeon, Dr. Orellana    He already has prescription for Reglan, Bentyl as well as his psychiatric medications   The patient will return for new or worsening symptoms and is stable at the time of discharge.    The patient is referred to a primary physician for blood pressure management, diabetic screening, and for all other preventative health concerns.      DISPOSITION:  Patient will be discharged home in stable condition.    FOLLOW UP:  Leonardo Orellana M.D.  [address] [address]  [phone]    Schedule an appointment as soon as possible for a visit         OUTPATIENT MEDICATIONS:  New Prescriptions    No medications on file         FINAL IMPRESSION  1. Nausea and vomiting, intractability of vomiting not specified, unspecified vomiting  type    2. Abdominal pain, unspecified abdominal location    3. Anxiety           The note accurately reflects work and decisions made by me.  Mitul Nash M.D.  4/5/2020  12:23 PM

## 2020-08-13 ENCOUNTER — HOSPITAL ENCOUNTER (EMERGENCY)
Facility: MEDICAL CENTER | Age: 40
End: 2020-08-13
Attending: EMERGENCY MEDICINE
Payer: COMMERCIAL

## 2020-08-13 VITALS
TEMPERATURE: 97.2 F | WEIGHT: 205.25 LBS | HEART RATE: 54 BPM | SYSTOLIC BLOOD PRESSURE: 179 MMHG | BODY MASS INDEX: 26.34 KG/M2 | HEIGHT: 74 IN | RESPIRATION RATE: 46 BRPM | DIASTOLIC BLOOD PRESSURE: 86 MMHG | OXYGEN SATURATION: 98 %

## 2020-08-13 DIAGNOSIS — R10.30 LOWER ABDOMINAL PAIN: ICD-10-CM

## 2020-08-13 DIAGNOSIS — R11.2 NON-INTRACTABLE VOMITING WITH NAUSEA, UNSPECIFIED VOMITING TYPE: ICD-10-CM

## 2020-08-13 LAB
ALBUMIN SERPL BCP-MCNC: 4.2 G/DL (ref 3.2–4.9)
ALBUMIN/GLOB SERPL: 1.8 G/DL
ALP SERPL-CCNC: 49 U/L (ref 30–99)
ALT SERPL-CCNC: 19 U/L (ref 2–50)
ANION GAP SERPL CALC-SCNC: 13 MMOL/L (ref 7–16)
AST SERPL-CCNC: 14 U/L (ref 12–45)
BASOPHILS # BLD AUTO: 0.3 % (ref 0–1.8)
BASOPHILS # BLD: 0.03 K/UL (ref 0–0.12)
BILIRUB SERPL-MCNC: 0.8 MG/DL (ref 0.1–1.5)
BUN SERPL-MCNC: 15 MG/DL (ref 8–22)
CALCIUM SERPL-MCNC: 9.1 MG/DL (ref 8.5–10.5)
CHLORIDE SERPL-SCNC: 101 MMOL/L (ref 96–112)
CO2 SERPL-SCNC: 23 MMOL/L (ref 20–33)
CREAT SERPL-MCNC: 0.91 MG/DL (ref 0.5–1.4)
EOSINOPHIL # BLD AUTO: 0.12 K/UL (ref 0–0.51)
EOSINOPHIL NFR BLD: 1.4 % (ref 0–6.9)
ERYTHROCYTE [DISTWIDTH] IN BLOOD BY AUTOMATED COUNT: 39.1 FL (ref 35.9–50)
GLOBULIN SER CALC-MCNC: 2.4 G/DL (ref 1.9–3.5)
GLUCOSE SERPL-MCNC: 127 MG/DL (ref 65–99)
HCT VFR BLD AUTO: 48.1 % (ref 42–52)
HGB BLD-MCNC: 16.1 G/DL (ref 14–18)
IMM GRANULOCYTES # BLD AUTO: 0.04 K/UL (ref 0–0.11)
IMM GRANULOCYTES NFR BLD AUTO: 0.5 % (ref 0–0.9)
LIPASE SERPL-CCNC: 35 U/L (ref 11–82)
LYMPHOCYTES # BLD AUTO: 2.59 K/UL (ref 1–4.8)
LYMPHOCYTES NFR BLD: 29.9 % (ref 22–41)
MCH RBC QN AUTO: 29.2 PG (ref 27–33)
MCHC RBC AUTO-ENTMCNC: 33.5 G/DL (ref 33.7–35.3)
MCV RBC AUTO: 87.3 FL (ref 81.4–97.8)
MONOCYTES # BLD AUTO: 0.69 K/UL (ref 0–0.85)
MONOCYTES NFR BLD AUTO: 8 % (ref 0–13.4)
NEUTROPHILS # BLD AUTO: 5.19 K/UL (ref 1.82–7.42)
NEUTROPHILS NFR BLD: 59.9 % (ref 44–72)
NRBC # BLD AUTO: 0 K/UL
NRBC BLD-RTO: 0 /100 WBC
PLATELET # BLD AUTO: 276 K/UL (ref 164–446)
PMV BLD AUTO: 9.5 FL (ref 9–12.9)
POTASSIUM SERPL-SCNC: 3.7 MMOL/L (ref 3.6–5.5)
PROT SERPL-MCNC: 6.6 G/DL (ref 6–8.2)
RBC # BLD AUTO: 5.51 M/UL (ref 4.7–6.1)
SODIUM SERPL-SCNC: 137 MMOL/L (ref 135–145)
WBC # BLD AUTO: 8.7 K/UL (ref 4.8–10.8)

## 2020-08-13 PROCEDURE — 96375 TX/PRO/DX INJ NEW DRUG ADDON: CPT

## 2020-08-13 PROCEDURE — 700111 HCHG RX REV CODE 636 W/ 250 OVERRIDE (IP): Performed by: EMERGENCY MEDICINE

## 2020-08-13 PROCEDURE — 80053 COMPREHEN METABOLIC PANEL: CPT

## 2020-08-13 PROCEDURE — 700105 HCHG RX REV CODE 258: Performed by: EMERGENCY MEDICINE

## 2020-08-13 PROCEDURE — 99285 EMERGENCY DEPT VISIT HI MDM: CPT

## 2020-08-13 PROCEDURE — 96374 THER/PROPH/DIAG INJ IV PUSH: CPT

## 2020-08-13 PROCEDURE — 83690 ASSAY OF LIPASE: CPT

## 2020-08-13 PROCEDURE — 85025 COMPLETE CBC W/AUTO DIFF WBC: CPT

## 2020-08-13 RX ORDER — METOCLOPRAMIDE HYDROCHLORIDE 5 MG/ML
10 INJECTION INTRAMUSCULAR; INTRAVENOUS ONCE
Status: COMPLETED | OUTPATIENT
Start: 2020-08-13 | End: 2020-08-13

## 2020-08-13 RX ORDER — ONDANSETRON 2 MG/ML
4 INJECTION INTRAMUSCULAR; INTRAVENOUS ONCE
Status: COMPLETED | OUTPATIENT
Start: 2020-08-13 | End: 2020-08-13

## 2020-08-13 RX ORDER — METOCLOPRAMIDE 10 MG/1
10 TABLET ORAL 4 TIMES DAILY
Qty: 30 TAB | Refills: 0 | Status: SHIPPED | OUTPATIENT
Start: 2020-08-13

## 2020-08-13 RX ORDER — SODIUM CHLORIDE, SODIUM LACTATE, POTASSIUM CHLORIDE, CALCIUM CHLORIDE 600; 310; 30; 20 MG/100ML; MG/100ML; MG/100ML; MG/100ML
1000 INJECTION, SOLUTION INTRAVENOUS ONCE
Status: COMPLETED | OUTPATIENT
Start: 2020-08-13 | End: 2020-08-13

## 2020-08-13 RX ORDER — MORPHINE SULFATE 4 MG/ML
4 INJECTION, SOLUTION INTRAMUSCULAR; INTRAVENOUS ONCE
Status: COMPLETED | OUTPATIENT
Start: 2020-08-13 | End: 2020-08-13

## 2020-08-13 RX ADMIN — SODIUM CHLORIDE, POTASSIUM CHLORIDE, SODIUM LACTATE AND CALCIUM CHLORIDE 1000 ML: 600; 310; 30; 20 INJECTION, SOLUTION INTRAVENOUS at 08:14

## 2020-08-13 RX ADMIN — METOCLOPRAMIDE 10 MG: 5 INJECTION, SOLUTION INTRAMUSCULAR; INTRAVENOUS at 08:14

## 2020-08-13 RX ADMIN — MORPHINE SULFATE 4 MG: 4 INJECTION INTRAVENOUS at 08:15

## 2020-08-13 RX ADMIN — ONDANSETRON 4 MG: 2 INJECTION INTRAMUSCULAR; INTRAVENOUS at 08:15

## 2020-08-13 ASSESSMENT — ENCOUNTER SYMPTOMS
NAUSEA: 1
CONSTIPATION: 0
SHORTNESS OF BREATH: 0
HEADACHES: 0
COUGH: 0
DIARRHEA: 0
BLOOD IN STOOL: 0
FEVER: 0
ABDOMINAL PAIN: 1
BACK PAIN: 1
VOMITING: 1

## 2020-08-13 ASSESSMENT — FIBROSIS 4 INDEX: FIB4 SCORE: 0.36

## 2020-08-13 NOTE — ED PROVIDER NOTES
"ED Provider Note    ED Provider Note    Primary care provider: Pcp Unknown  Means of arrival: POV  History obtained from: patient  History limited by: None    CHIEF COMPLAINT  Chief Complaint   Patient presents with   • Abdominal Pain     lower abdominal pain pt reports is chronic since his appendectomy in february   • N/V     pt unable to keep down any meals, reports significant weight loss since surgery and constant vomiting        HPI  Alek Davila is a 40 y.o. male who presents to the Emergency Department with a chief complaint of abdominal pain, primarily, across his lower abdomen but is diffuse.  It is unfortunately, chronic.  Patient states he has been dealing with GI issues and having a \"sensitive stomach\", since the age of 12 even going on to say it is a running joke in his family.  There are certain foods that he cannot tolerated at all such as processed meats and nitrites.  He suspects, that he likely has either irritable bowel syndrome or bones disease although he has never had a colonoscopy.  He has had an endoscopy some years ago.  It was reportedly, nondiagnostic.  He is able to manage his symptoms to some degree with oral antiemetics at home, Zofran and Phenergan.  He reports an approximate 30 pound weight loss.  Typically, he is about 225 and now reports, that he weighs less 200 pounds.  He has tried marijuana and it relieves his symptoms some but he has been instructed not to use it based on turn for cyclic vomiting associated with marijuana so he does not use it with any regularity.  In March of this year, patient had an appendicitis.  Since then, his symptoms have worsened.  He has been in this emergency department multiple times, he is also been seen in [place] and [place] as recently as Monday night for the symptoms.  Over the years, he states he has been treated with what he believes is a probiotic, he describes it as a pill and it seemed to make his symptoms improved this was years ago. "  He believes he has been on PPIs past and denies any significant improvement with those.  Unfortunately, due to a busy schedule, he has been unable to follow-up with GI to pursue colonoscopy.  He has been working for [employer] for about 6 years.  About 2 months ago, he excepted a position at their facility in [place] so now, he commutes to [place] 5 days a week.  He has 2 children who live here in [place].  He does note that this is been somewhat stressful, riding in a car, has always been a difficult activity for him causing significant discomfort which he is doing more of lately but he also feels that these elements, are not the sole cause of his ongoing symptoms.  He has not had a fever.  No chest pain or shortness of breath.  No hematemesis.  No blood in his stool.  His urine output has been normal.    REVIEW OF SYSTEMS  Review of Systems   Constitutional: Negative for fever.   HENT: Negative for congestion.    Respiratory: Negative for cough and shortness of breath.    Cardiovascular: Negative for chest pain.   Gastrointestinal: Positive for abdominal pain, nausea and vomiting. Negative for blood in stool, constipation and diarrhea.   Genitourinary: Negative for dysuria and urgency.   Musculoskeletal: Positive for back pain.        Chronic   Neurological: Negative for headaches.   All other systems reviewed and are negative.      PAST MEDICAL HISTORY   has a past medical history of Anxiety, Depression, and Pancreatitis.    SURGICAL HISTORY   has a past surgical history that includes lap,appendectomy (N/A, 3/15/2020) and other abdominal surgery.    SOCIAL HISTORY  Social History     Tobacco Use   • Smoking status: Never Smoker   • Smokeless tobacco: Never Used   Substance Use Topics   • Alcohol use: Yes     Frequency: 2-3 times a week     Comment: social   • Drug use: Never      Social History     Substance and Sexual Activity   Drug Use Never       FAMILY HISTORY  History reviewed. No pertinent family  "history.    CURRENT MEDICATIONS  Home Medications     Reviewed by Lisset Blanco R.N. (Registered Nurse) on 08/13/20 at 0641  Med List Status: Complete   Medication Last Dose Status   dicyclomine (BENTYL) 20 MG Tab  Active   escitalopram (LEXAPRO) 10 MG Tab  Active   metoclopramide (REGLAN) 10 MG Tab  Active                ALLERGIES  Allergies   Allergen Reactions   • Benadryl [Altaryl]      Psychosis    • Pepcid Unspecified     Psychosis        PHYSICAL EXAM  VITAL SIGNS: /99   Pulse 65   Temp 36.2 °C (97.2 °F) (Oral)   Resp 20   Ht 1.88 m (6' 2\")   Wt 93.1 kg (205 lb 4 oz)   SpO2 100%   BMI 26.35 kg/m²   Vitals reviewed.  Constitutional: Patient is oriented to person, place, and time. Appears well-developed and well-nourished. Mild distress.  Restless.  Head: Normocephalic and atraumatic.   Ears: Normal external ears bilaterally.   Mouth/Throat: Oropharynx is clear and moist  Eyes: Conjunctivae are normal.   Neck: Normal range of motion.   Cardiovascular: Normal rate, regular rhythm and normal heart sounds.   Pulmonary/Chest: Effort normal and breath sounds normal. No respiratory distress, no wheezes, rhonchi, or rales.   Abdominal: Soft. Bowel sounds are normal. There is diffuse tenderness. No rebound or guarding, or peritoneal signs. No CVA tenderness.  Musculoskeletal: No edema and no tenderness .   Neurological: No focal deficits.   Skin: Skin is warm and dry. No erythema. No pallor.   Psychiatric: Patient has a normal mood and affect.     LABS  Results for orders placed or performed during the hospital encounter of 08/13/20   CBC WITH DIFFERENTIAL   Result Value Ref Range    WBC 8.7 4.8 - 10.8 K/uL    RBC 5.51 4.70 - 6.10 M/uL    Hemoglobin 16.1 14.0 - 18.0 g/dL    Hematocrit 48.1 42.0 - 52.0 %    MCV 87.3 81.4 - 97.8 fL    MCH 29.2 27.0 - 33.0 pg    MCHC 33.5 (L) 33.7 - 35.3 g/dL    RDW 39.1 35.9 - 50.0 fL    Platelet Count 276 164 - 446 K/uL    MPV 9.5 9.0 - 12.9 fL    Neutrophils-Polys 59.90 " 44.00 - 72.00 %    Lymphocytes 29.90 22.00 - 41.00 %    Monocytes 8.00 0.00 - 13.40 %    Eosinophils 1.40 0.00 - 6.90 %    Basophils 0.30 0.00 - 1.80 %    Immature Granulocytes 0.50 0.00 - 0.90 %    Nucleated RBC 0.00 /100 WBC    Neutrophils (Absolute) 5.19 1.82 - 7.42 K/uL    Lymphs (Absolute) 2.59 1.00 - 4.80 K/uL    Monos (Absolute) 0.69 0.00 - 0.85 K/uL    Eos (Absolute) 0.12 0.00 - 0.51 K/uL    Baso (Absolute) 0.03 0.00 - 0.12 K/uL    Immature Granulocytes (abs) 0.04 0.00 - 0.11 K/uL    NRBC (Absolute) 0.00 K/uL   COMP METABOLIC PANEL   Result Value Ref Range    Sodium 137 135 - 145 mmol/L    Potassium 3.7 3.6 - 5.5 mmol/L    Chloride 101 96 - 112 mmol/L    Co2 23 20 - 33 mmol/L    Anion Gap 13.0 7.0 - 16.0    Glucose 127 (H) 65 - 99 mg/dL    Bun 15 8 - 22 mg/dL    Creatinine 0.91 0.50 - 1.40 mg/dL    Calcium 9.1 8.5 - 10.5 mg/dL    AST(SGOT) 14 12 - 45 U/L    ALT(SGPT) 19 2 - 50 U/L    Alkaline Phosphatase 49 30 - 99 U/L    Total Bilirubin 0.8 0.1 - 1.5 mg/dL    Albumin 4.2 3.2 - 4.9 g/dL    Total Protein 6.6 6.0 - 8.2 g/dL    Globulin 2.4 1.9 - 3.5 g/dL    A-G Ratio 1.8 g/dL   LIPASE   Result Value Ref Range    Lipase 35 11 - 82 U/L   ESTIMATED GFR   Result Value Ref Range    GFR If African American >60 >60 mL/min/1.73 m 2    GFR If Non African American >60 >60 mL/min/1.73 m 2       All labs reviewed by me.    RADIOLOGY  No orders to display     The radiologist's interpretation of all radiological studies have been reviewed by me.    COURSE & MEDICAL DECISION MAKING  Pertinent Labs & Imaging studies reviewed. (See chart for details)    Obtained and reviewed past medical records.  Patient's last encounter was April 3rd, and 5th he was seen in the emergency department with nausea, vomiting, abdominal pain and anxiety.  Prior to that, he was seen in the emergency department March 25 with abdominal pain, right lower quadrant.  This is in the setting of having had an appendectomy on March 15.  At that time,  patient was discharged to home after consultation with surgery, Dr. Orellana, who saw the patient initially for his emergent appendectomy.  Patient was admitted to the hospital postoperatively on March 21, discharged home the next day.  He was admitted for nausea vomiting and lactic acidosis.  He was admitted March 14 with a perforated appendicitis.    7:38 AM - Patient seen and examined at bedside.  This is a pleasant 40-year-old male, who unfortunately, has been struggling with GI symptoms for nearly 3 decades.  Worsened, recently by laparoscopic appendectomy.  He has followed up with Dr. llanes since that time and had a thorough work-up including an evaluation for possible cancer all of which was negative.  He has not, unfortunately due to his schedule, been able to follow-up with GI which we did discuss making a priority.  He is afebrile.  No blood in his vomitus.  Symptoms are in keeping with his chronic abdominal pain symptoms.  Barring any significant I will have abnormalities as he has had lactic acidosis and electrolyte disturbances in the past, I do not see indication based on my exam, for repeat imaging.  And IVs been established, labs drawn per nursing protocols including CBC, chemistry and lipase.  Patient will be treated with pain medication, antiemetics and IV fluids given his history of nausea vomiting inability to take p.o.'s.      0915AM patient's reevaluated the bedside.  He is resting and appears comfortable.  He reports subjective improvement.  We discussed lab results which were overall unrevealing other than a mild increase in his glucose.  White blood cell count is normal.  There is no neutrophilic shift.  There is normal hemoglobin and normal platelet count.  Lipase is normal.  At this point, I feel the patient can safely be discharged home.  I have encouraged him to make following up with GI a priority.  GI consultants are on-call today and he is encouraged to follow-up with them to make an  "appointment.  Of also recommended trial of Reglan to be taken on a scheduled basis particularly on days when he wakes up and consents that he is going to have \"a bad day\".  He is agreeable to this plan of care.  He has normal vital signs.  He is given strict return precautions.  He will be discharged home in stable condition.    FINAL IMPRESSION  1. Non-intractable vomiting with nausea, unspecified vomiting type    2. Lower abdominal pain                     "

## 2020-08-13 NOTE — ED TRIAGE NOTES
"  Alek Davila   40 y.o. male   Chief Complaint   Patient presents with   • Abdominal Pain     lower abdominal pain pt reports is chronic since his appendectomy in february   • N/V     pt unable to keep down any meals, reports significant weight loss since surgery and constant vomiting       Pt amb to triage with steady gait for above complaint.   Pt is alert and oriented, speaking in full sentences, follows commands and responds appropriately to questions. Pt appears to be in pain and discomfort during triage. Resp are even and unlabored.   Pt placed in lobby. Pt educated on triage process. Pt encouraged to alert staff for any changes.    /99   Pulse 65   Temp 36.2 °C (97.2 °F) (Oral)   Resp 20   Ht 1.88 m (6' 2\")   Wt 93.1 kg (205 lb 4 oz)   SpO2 100%   BMI 26.35 kg/m²     "

## 2020-08-13 NOTE — DISCHARGE INSTRUCTIONS
Try troubleshooting your symptoms by taking Reglan not particularly on days when you can sense that it might be a bad day.  This is something that can be taken every 6 hours as needed.  I encourage you, to make following up with gastroenterology at priority.  Colonoscopy findings may be helpful in guiding your ongoing health care.  GI consultants on [facility], our on-call for GI.  Please see your discharge papers for information regarding their location and contact info.

## 2020-08-13 NOTE — ED NOTES
Pt discharged to home. Pt was given follow up instructions and prescriptions for Reglan. Pt verbalized understanding of all instructions for discharge and is ambulatory out of ED with steady gait.

## 2020-08-13 NOTE — ED NOTES
Pt given ice chips. Aware of plan for discharge, fluids still infusing, encouraged to keep arm straight to keep IVF infusing.

## (undated) DEVICE — MASK ANESTHESIA ADULT  - (100/CA)

## (undated) DEVICE — BAG RETRIEVAL 10ML (10EA/BX)

## (undated) DEVICE — SUTURE 0 VICRYL PLUS CT-2 - 27 INCH (36/BX)

## (undated) DEVICE — DRESSING TRANSPARENT FILM TEGADERM 4 X 4.75" (50EA/BX)"

## (undated) DEVICE — PROTECTOR ULNA NERVE - (36PR/CA)

## (undated) DEVICE — GLOVE BIOGEL INDICATOR SZ 8 SURGICAL PF LTX - (50/BX 4BX/CA)

## (undated) DEVICE — TROCAR Z THREAD 12 X 100 - BLADED (6/BX)

## (undated) DEVICE — SUTURE GENERAL

## (undated) DEVICE — GOWN WARMING STANDARD FLEX - (30/CA)

## (undated) DEVICE — STAPLER 45MM ARTICULATING - ENDO (3EA/BX)

## (undated) DEVICE — SUTURE 4-0 VICRYL PLUS FS-2 - 27 INCH (36/BX)

## (undated) DEVICE — TUBING CLEARLINK DUO-VENT - C-FLO (48EA/CA)

## (undated) DEVICE — DETERGENT RENUZYME PLUS 10 OZ PACKET (50/BX)

## (undated) DEVICE — ELECTRODE DUAL RETURN W/ CORD - (50/PK)

## (undated) DEVICE — ELECTRODE 850 FOAM ADHESIVE - HYDROGEL RADIOTRNSPRNT (50/PK)

## (undated) DEVICE — TROCAR Z THREAD 11 X 100 - BLADED (6/BX)

## (undated) DEVICE — CANISTER SUCTION 3000ML MECHANICAL FILTER AUTO SHUTOFF MEDI-VAC NONSTERILE LF DISP  (40EA/CA)

## (undated) DEVICE — HEAD HOLDER JUNIOR/ADULT

## (undated) DEVICE — SHEARS ENDO 5MM SHORT - (6EA/CA)

## (undated) DEVICE — DRAPESURG STERI-DRAPE LONG - (10/BX 4BX/CA)

## (undated) DEVICE — NEPTUNE 4 PORT MANIFOLD - (20/PK)

## (undated) DEVICE — SET SUCTION/IRRIGATION WITH DISPOSABLE TIP (6/CA )PART #0250-070-520 IS A SUB

## (undated) DEVICE — GLOVE BIOGEL ECLIPSE PF LATEX SIZE 8.0  (50PR/BX)

## (undated) DEVICE — TUBING INSUFFLATION - (10/BX)

## (undated) DEVICE — PACK LAP CHOLE OR - (2EA/CA)

## (undated) DEVICE — SET LEADWIRE 5 LEAD BEDSIDE DISPOSABLE ECG (1SET OF 5/EA)

## (undated) DEVICE — WATER IRRIG. STER. 1500 ML - (9/CA)

## (undated) DEVICE — SENSOR SPO2 NEO LNCS ADHESIVE (20/BX) SEE USER NOTES

## (undated) DEVICE — CHLORAPREP 26 ML APPLICATOR - ORANGE TINT(25/CA)

## (undated) DEVICE — LACTATED RINGERS INJ 1000 ML - (14EA/CA 60CA/PF)

## (undated) DEVICE — SODIUM CHL IRRIGATION 0.9% 1000ML (12EA/CA)

## (undated) DEVICE — SEALER VESSEL HARMONIC ACE PLUS WITH ADVANCED HEMOSTASIS 36CM (1/EA)

## (undated) DEVICE — KIT ANESTHESIA W/CIRCUIT & 3/LT BAG W/FILTER (20EA/CA)

## (undated) DEVICE — SUCTION INSTRUMENT YANKAUER BULBOUS TIP W/O VENT (50EA/CA)

## (undated) DEVICE — SET EXTENSION WITH 2 PORTS (48EA/CA) ***PART #2C8610 IS A SUBSTITUTE*****

## (undated) DEVICE — SUTURE 0 VICRYL PLUS UR-6 - 27 INCH (36/BX)

## (undated) DEVICE — NEEDLE INSFL 120MM 14GA VRRS - (20/BX)

## (undated) DEVICE — SLEEVE, VASO, THIGH, MED

## (undated) DEVICE — KIT ROOM DECONTAMINATION

## (undated) DEVICE — SPONGE GAUZESTER. 2X2 4-PL - (2/PK 50PK/BX 30BX/CS)

## (undated) DEVICE — TROCAR 5X100 BLADED Z-THREAD - KII (6/BX)

## (undated) DEVICE — STAPLE 45MM BLUE 4.5MM (12EA/BX)

## (undated) DEVICE — DRESSING TRANSPARENT FILM TEGADERM 2.375 X 2.75"  (100EA/BX)"